# Patient Record
Sex: FEMALE | Race: BLACK OR AFRICAN AMERICAN | NOT HISPANIC OR LATINO | Employment: UNEMPLOYED | URBAN - METROPOLITAN AREA
[De-identification: names, ages, dates, MRNs, and addresses within clinical notes are randomized per-mention and may not be internally consistent; named-entity substitution may affect disease eponyms.]

---

## 2023-06-02 LAB
ALANINE AMINOTRANSFERASE (SGPT) (U/L) IN SER/PLAS: 14 U/L (ref 7–45)
ALBUMIN (G/DL) IN SER/PLAS: 4.1 G/DL (ref 3.4–5)
ALKALINE PHOSPHATASE (U/L) IN SER/PLAS: 82 U/L (ref 33–136)
ANION GAP IN SER/PLAS: 12 MMOL/L (ref 10–20)
ASPARTATE AMINOTRANSFERASE (SGOT) (U/L) IN SER/PLAS: 23 U/L (ref 9–39)
BASOPHILS (10*3/UL) IN BLOOD BY AUTOMATED COUNT: 0.04 X10E9/L (ref 0–0.1)
BASOPHILS/100 LEUKOCYTES IN BLOOD BY AUTOMATED COUNT: 0.8 % (ref 0–2)
BILIRUBIN TOTAL (MG/DL) IN SER/PLAS: 0.3 MG/DL (ref 0–1.2)
CALCIDIOL (25 OH VITAMIN D3) (NG/ML) IN SER/PLAS: 24 NG/ML
CALCIUM (MG/DL) IN SER/PLAS: 9.6 MG/DL (ref 8.6–10.6)
CARBON DIOXIDE, TOTAL (MMOL/L) IN SER/PLAS: 26 MMOL/L (ref 21–32)
CHLORIDE (MMOL/L) IN SER/PLAS: 108 MMOL/L (ref 98–107)
COBALAMIN (VITAMIN B12) (PG/ML) IN SER/PLAS: >2000 PG/ML (ref 211–911)
CREATININE (MG/DL) IN SER/PLAS: 0.98 MG/DL (ref 0.5–1.05)
EOSINOPHILS (10*3/UL) IN BLOOD BY AUTOMATED COUNT: 0.37 X10E9/L (ref 0–0.4)
EOSINOPHILS/100 LEUKOCYTES IN BLOOD BY AUTOMATED COUNT: 7 % (ref 0–6)
ERYTHROCYTE DISTRIBUTION WIDTH (RATIO) BY AUTOMATED COUNT: 14.5 % (ref 11.5–14.5)
ERYTHROCYTE MEAN CORPUSCULAR HEMOGLOBIN CONCENTRATION (G/DL) BY AUTOMATED: 31.4 G/DL (ref 32–36)
ERYTHROCYTE MEAN CORPUSCULAR VOLUME (FL) BY AUTOMATED COUNT: 92 FL (ref 80–100)
ERYTHROCYTES (10*6/UL) IN BLOOD BY AUTOMATED COUNT: 4.39 X10E12/L (ref 4–5.2)
FERRITIN (UG/LL) IN SER/PLAS: 48 UG/L (ref 8–150)
FOLATE (NG/ML) IN SER/PLAS: 21.8 NG/ML
GFR FEMALE: 61 ML/MIN/1.73M2
GLUCOSE (MG/DL) IN SER/PLAS: 73 MG/DL (ref 74–99)
HEMATOCRIT (%) IN BLOOD BY AUTOMATED COUNT: 40.4 % (ref 36–46)
HEMOGLOBIN (G/DL) IN BLOOD: 12.7 G/DL (ref 12–16)
IMMATURE GRANULOCYTES/100 LEUKOCYTES IN BLOOD BY AUTOMATED COUNT: 0.2 % (ref 0–0.9)
IRON (UG/DL) IN SER/PLAS: 71 UG/DL (ref 35–150)
LEUKOCYTES (10*3/UL) IN BLOOD BY AUTOMATED COUNT: 5.3 X10E9/L (ref 4.4–11.3)
LYMPHOCYTES (10*3/UL) IN BLOOD BY AUTOMATED COUNT: 2.03 X10E9/L (ref 0.8–3)
LYMPHOCYTES/100 LEUKOCYTES IN BLOOD BY AUTOMATED COUNT: 38.2 % (ref 13–44)
MONOCYTES (10*3/UL) IN BLOOD BY AUTOMATED COUNT: 0.46 X10E9/L (ref 0.05–0.8)
MONOCYTES/100 LEUKOCYTES IN BLOOD BY AUTOMATED COUNT: 8.6 % (ref 2–10)
NEUTROPHILS (10*3/UL) IN BLOOD BY AUTOMATED COUNT: 2.41 X10E9/L (ref 1.6–5.5)
NEUTROPHILS/100 LEUKOCYTES IN BLOOD BY AUTOMATED COUNT: 45.2 % (ref 40–80)
NRBC (PER 100 WBCS) BY AUTOMATED COUNT: 0 /100 WBC (ref 0–0)
PARATHYRIN INTACT (PG/ML) IN SER/PLAS: 59.9 PG/ML (ref 18.5–88)
PLATELETS (10*3/UL) IN BLOOD AUTOMATED COUNT: 337 X10E9/L (ref 150–450)
POTASSIUM (MMOL/L) IN SER/PLAS: 5.3 MMOL/L (ref 3.5–5.3)
PROTEIN TOTAL: 6.7 G/DL (ref 6.4–8.2)
SODIUM (MMOL/L) IN SER/PLAS: 141 MMOL/L (ref 136–145)
UREA NITROGEN (MG/DL) IN SER/PLAS: 21 MG/DL (ref 6–23)

## 2023-06-05 LAB
COPPER: NORMAL
VITAMIN B1, WHOLE BLOOD: 169 NMOL/L (ref 70–180)
ZINC,SERUM OR PLASMA: NORMAL

## 2023-09-27 PROBLEM — M62.89 PELVIC FLOOR DYSFUNCTION: Status: ACTIVE | Noted: 2023-09-27

## 2023-09-27 PROBLEM — L90.0 LICHEN SCLEROSUS: Status: ACTIVE | Noted: 2023-09-27

## 2023-09-27 PROBLEM — K21.9 GASTROESOPHAGEAL REFLUX DISEASE: Status: ACTIVE | Noted: 2023-09-27

## 2023-09-27 PROBLEM — N76.3 CHRONIC VULVITIS: Status: ACTIVE | Noted: 2023-09-27

## 2023-09-27 PROBLEM — L30.9 ECZEMA: Status: ACTIVE | Noted: 2023-09-27

## 2023-09-27 PROBLEM — M79.7 FIBROMYALGIA: Status: ACTIVE | Noted: 2023-09-27

## 2023-09-27 PROBLEM — I50.9 HEART FAILURE (MULTI): Status: ACTIVE | Noted: 2023-09-27

## 2023-09-27 PROBLEM — N39.41 URGE INCONTINENCE: Status: ACTIVE | Noted: 2023-09-27

## 2023-09-27 PROBLEM — N81.89 PELVIC FLOOR WEAKNESS: Status: ACTIVE | Noted: 2023-09-27

## 2023-09-27 PROBLEM — R31.9 HEMATURIA OF UNDIAGNOSED CAUSE: Status: ACTIVE | Noted: 2023-09-27

## 2023-09-27 PROBLEM — K90.9 INTESTINAL MALABSORPTION (HHS-HCC): Status: ACTIVE | Noted: 2023-09-27

## 2023-09-27 PROBLEM — K80.50 BILIARY COLIC: Status: ACTIVE | Noted: 2023-09-27

## 2023-09-27 PROBLEM — E78.5 HYPERLIPIDEMIA: Status: ACTIVE | Noted: 2023-09-27

## 2023-09-27 PROBLEM — R63.5 ABNORMAL WEIGHT GAIN: Status: ACTIVE | Noted: 2023-09-27

## 2023-09-27 PROBLEM — Z96.659 ARTIFICIAL KNEE JOINT PRESENT: Status: ACTIVE | Noted: 2023-09-27

## 2023-09-27 PROBLEM — E66.9 OBESITY: Status: ACTIVE | Noted: 2023-09-27

## 2023-09-27 PROBLEM — M17.9 OSTEOARTHRITIS OF KNEE: Status: ACTIVE | Noted: 2023-09-27

## 2023-09-27 PROBLEM — M65.9 TENOSYNOVITIS OF THUMB: Status: ACTIVE | Noted: 2023-09-27

## 2023-09-27 PROBLEM — K57.90 DIVERTICULAR DISEASE: Status: ACTIVE | Noted: 2023-09-27

## 2023-09-27 PROBLEM — R32 URINARY INCONTINENCE: Status: ACTIVE | Noted: 2023-09-27

## 2023-09-27 PROBLEM — M65.949 TENOSYNOVITIS OF THUMB: Status: ACTIVE | Noted: 2023-09-27

## 2023-09-27 PROBLEM — Z90.49 ACQUIRED ABSENCE OF OTHER SPECIFIED PARTS OF DIGESTIVE TRACT: Status: ACTIVE | Noted: 2023-09-27

## 2023-09-27 PROBLEM — Z91.89 AT RISK FOR BLEEDING: Status: ACTIVE | Noted: 2023-09-27

## 2023-09-27 PROBLEM — G47.00 INSOMNIA: Status: ACTIVE | Noted: 2023-09-27

## 2023-09-27 PROBLEM — R35.1 NOCTURIA: Status: ACTIVE | Noted: 2023-09-27

## 2023-09-27 PROBLEM — M19.031 ARTHRITIS OF WRIST, RIGHT: Status: ACTIVE | Noted: 2023-09-27

## 2023-09-27 PROBLEM — G47.30 SLEEP APNEA: Status: ACTIVE | Noted: 2023-09-27

## 2023-09-27 PROBLEM — E21.3 HYPERPARATHYROIDISM (MULTI): Status: ACTIVE | Noted: 2023-09-27

## 2023-09-27 PROBLEM — A04.8 H. PYLORI INFECTION: Status: ACTIVE | Noted: 2023-09-27

## 2023-09-27 PROBLEM — I10 ESSENTIAL HYPERTENSION: Status: ACTIVE | Noted: 2023-09-27

## 2023-09-27 PROBLEM — N95.2 ATROPHIC VAGINITIS: Status: ACTIVE | Noted: 2023-09-27

## 2023-09-27 PROBLEM — E55.9 VITAMIN D DEFICIENCY: Status: ACTIVE | Noted: 2023-09-27

## 2023-09-27 PROBLEM — J45.909 ASTHMA (HHS-HCC): Status: ACTIVE | Noted: 2023-09-27

## 2023-09-27 PROBLEM — E16.2 HYPOGLYCEMIA: Status: ACTIVE | Noted: 2023-09-27

## 2023-09-27 RX ORDER — ESTRADIOL 0.1 MG/G
CREAM VAGINAL
COMMUNITY
Start: 2022-08-12 | End: 2023-10-26 | Stop reason: ALTCHOICE

## 2023-09-27 RX ORDER — MAGNESIUM 250 MG
TABLET ORAL
COMMUNITY
End: 2023-10-26 | Stop reason: ALTCHOICE

## 2023-09-27 RX ORDER — TRAMADOL HYDROCHLORIDE 50 MG/1
TABLET ORAL
COMMUNITY
Start: 2016-05-30 | End: 2023-10-26 | Stop reason: SINTOL

## 2023-09-27 RX ORDER — HYDROCHLOROTHIAZIDE 25 MG/1
TABLET ORAL
COMMUNITY
Start: 2015-01-30

## 2023-09-27 RX ORDER — CYCLOBENZAPRINE HCL 10 MG
TABLET ORAL
COMMUNITY
Start: 2015-01-30 | End: 2023-10-26 | Stop reason: ALTCHOICE

## 2023-09-27 RX ORDER — ZOLPIDEM TARTRATE 10 MG/1
TABLET ORAL
COMMUNITY
Start: 2015-02-06 | End: 2023-10-26 | Stop reason: SINTOL

## 2023-09-27 RX ORDER — FAMOTIDINE 40 MG/1
TABLET, FILM COATED ORAL
COMMUNITY
Start: 2016-01-08 | End: 2023-10-26 | Stop reason: ALTCHOICE

## 2023-09-27 RX ORDER — CHOLESTYRAMINE 4 G/9G
POWDER, FOR SUSPENSION ORAL
COMMUNITY
Start: 2022-04-15

## 2023-09-27 RX ORDER — DIPHENHYDRAMINE HCL 25 MG
CAPSULE ORAL
COMMUNITY
Start: 2015-12-21 | End: 2023-10-26 | Stop reason: ALTCHOICE

## 2023-09-27 RX ORDER — MULTIVITAMIN
TABLET ORAL
COMMUNITY
Start: 2016-01-08

## 2023-09-27 RX ORDER — IBUPROFEN 600 MG/1
TABLET ORAL
COMMUNITY
Start: 2017-03-19 | End: 2023-10-26 | Stop reason: SINTOL

## 2023-09-27 RX ORDER — DIAZEPAM 5 MG/1
TABLET ORAL
COMMUNITY
Start: 2016-05-30 | End: 2023-10-26 | Stop reason: ALTCHOICE

## 2023-09-27 RX ORDER — AMITRIPTYLINE HYDROCHLORIDE 10 MG/1
1 TABLET, FILM COATED ORAL NIGHTLY
COMMUNITY
Start: 2022-06-30 | End: 2023-10-26 | Stop reason: ALTCHOICE

## 2023-09-27 RX ORDER — IBUPROFEN 800 MG/1
TABLET ORAL
COMMUNITY
Start: 2016-01-08 | End: 2023-10-26 | Stop reason: SINTOL

## 2023-09-27 RX ORDER — MOMETASONE FUROATE 220 UG/1
INHALANT RESPIRATORY (INHALATION)
COMMUNITY
Start: 2016-01-08 | End: 2023-10-26 | Stop reason: ALTCHOICE

## 2023-09-27 RX ORDER — FAMOTIDINE 20 MG/1
TABLET, FILM COATED ORAL
COMMUNITY
Start: 2016-01-11 | End: 2023-10-26 | Stop reason: ALTCHOICE

## 2023-09-27 RX ORDER — TIZANIDINE 4 MG/1
TABLET ORAL
COMMUNITY
Start: 2023-09-07 | End: 2024-02-05 | Stop reason: SDUPTHER

## 2023-09-27 RX ORDER — GABAPENTIN 300 MG/1
TABLET, FILM COATED ORAL
COMMUNITY
End: 2024-04-02 | Stop reason: ALTCHOICE

## 2023-09-27 RX ORDER — CLOTRIMAZOLE AND BETAMETHASONE DIPROPIONATE 10; .64 MG/G; MG/G
CREAM TOPICAL
COMMUNITY
Start: 2022-05-18

## 2023-09-27 RX ORDER — ALBUTEROL SULFATE 90 UG/1
AEROSOL, METERED RESPIRATORY (INHALATION)
COMMUNITY
Start: 2014-04-08

## 2023-09-27 RX ORDER — OMEPRAZOLE 40 MG/1
CAPSULE, DELAYED RELEASE ORAL
COMMUNITY
Start: 2016-01-28

## 2023-10-04 ENCOUNTER — CLINICAL SUPPORT (OUTPATIENT)
Dept: AUDIOLOGY | Facility: CLINIC | Age: 73
End: 2023-10-04
Payer: MEDICARE

## 2023-10-04 DIAGNOSIS — R42 VERTIGO: ICD-10-CM

## 2023-10-04 DIAGNOSIS — H90.3 SENSORINEURAL HEARING LOSS (SNHL) OF BOTH EARS: ICD-10-CM

## 2023-10-04 DIAGNOSIS — H93.13 TINNITUS OF BOTH EARS: Primary | ICD-10-CM

## 2023-10-04 PROCEDURE — 92550 TYMPANOMETRY & REFLEX THRESH: CPT | Performed by: AUDIOLOGIST

## 2023-10-04 PROCEDURE — 92557 COMPREHENSIVE HEARING TEST: CPT | Performed by: AUDIOLOGIST

## 2023-10-04 NOTE — PROGRESS NOTES
"  AUDIOMETRIC EVALUATION    Name:  Oxana Rao  :  1950  Age:  73 y.o.  Date of Evaluation:  2023    HISTORY:  Reason for visit: Ms. Rao is seen today for an evaluation of hearing in conjunction with an upcoming appointment with Jamia Sorto CNP. Patient was unaccompanied.     Patient reports recent exacerbation in bilateral tinnitus. She is also reporting a throbbing headache on the right side of her head with pain described as feeling like an ice-pick in going into her head. She endorses some nausea. She reports having a vertigo attack yesterday when standing up from laying down. She has been taking dramamine. She endorses TMJ issues. Ms. Rao also reports beginning a new medication Metformin approximately two weeks ago.    Denies: Otalgia, aural fullness, hearing loss    EVALUATION:    See Audiogram and Immittance results under \"Media\".    RESULTS:     Otoscopic Evaluation:     RIGHT  External ear exam: Normal   Internal ear exam: Significant amount of cerumen occluding canal and prohibiting visualization of TM    LEFT  External ear exam: Normal   Internal ear exam: Significant amount of cerumen occluding canal and prohibiting visualization of TM    Immittance:   Immittance Measures: 226 Hz          Right Ear: Type A: Middle ear pressure and eardrum mobility within defined limits         Left Ear:  Type A: Middle ear pressure and eardrum mobility within defined limits    Reflexes and Reflex Decay:    Ipsilateral Reflexes (500-4000 Hz):          Probe/Stimulus Right Ear: Present 500-4000 Hz       Probe/Stimulus Left Ear: Present 500-4000 Hz    Contralateral Reflexes (500-4000 Hz):  Did not test         Acoustic Reflex Decay (contralateral at 500 and 1000 Hz): Did not test           Otoacoustic Emissions [DP(OAEs)]: Did not test         Audiometry:  Test Technique: Standard Audiometry under TDH headphones.    Reliability: good     Pure Tone Audiometry:    Right: Hearing sensitivity within " normal limits at 125-4000 Hz falling to mild likely sensorineural hearing loss at 3744-7917 Hz   Left: Hearing sensitivity within normal limits at 125-4000 Hz falling to mild likely sensorineural hearing loss at 8617-8108 Hz       Speech Audiometry (via recorded, 25-words unless noted; M=masked):       Right Ear: Speech Reception Threshold (SRT) was obtained at 15 dBHL  Word Recognition Scores were excellent (100%) in quiet when words were presented at 55 dBHL, using the NU-6 2A word list.  Left Ear: Speech Reception Threshold (SRT) was obtained at 10 dBHL  Word Recognition Scores were excellent (96%) in quiet when words were presented at 55 dBHL, using the NU-6 3A word list.      IMPRESSIONS:  Today's test results suggest normal middle ear function and normal hearing levels at 125-4000 Hz falling to mild likely sensorineural  hearing loss at 7483-7512 Hz . Word understanding is excellent, bilaterally.    PATIENT EDUCATION:   Ms. Rao was counseled with regard to the findings.       PLAN:  See Jamia Sorto CNP as scheduled on 10/31/23.  Follow up with medical providers as directed.   Retest hearing in conjunction with medical management and sooner if any concerns arise.        Anna Ford, MARIE, CCC-A  Clinical Audiologist    Degree of   Hearing Sensitivity dB Range   Within Normal Limits (WNL) 0 - 20   Slight 25   Mild 26 - 40   Moderate 41 - 55   Moderately-Severe 56 - 70   Severe 71 - 90   Profound 91 +     KEY  TM Tympanic Membrane   WNL Within Normal Limits   HA Hearing Aid   SNHL Sensorineural Hearing Loss   CHL Conductive Hearing Loss   NIHL Noise-Induced Hearing Loss   ECV Ear Canal Volume

## 2023-10-20 ENCOUNTER — APPOINTMENT (OUTPATIENT)
Dept: SURGERY | Facility: CLINIC | Age: 73
End: 2023-10-20
Payer: MEDICARE

## 2023-10-24 ENCOUNTER — APPOINTMENT (OUTPATIENT)
Dept: SURGERY | Facility: CLINIC | Age: 73
End: 2023-10-24
Payer: MEDICARE

## 2023-10-24 ENCOUNTER — OFFICE VISIT (OUTPATIENT)
Dept: SURGERY | Facility: CLINIC | Age: 73
End: 2023-10-24
Payer: MEDICARE

## 2023-10-24 VITALS
HEART RATE: 74 BPM | BODY MASS INDEX: 29.82 KG/M2 | WEIGHT: 190 LBS | DIASTOLIC BLOOD PRESSURE: 85 MMHG | SYSTOLIC BLOOD PRESSURE: 146 MMHG | HEIGHT: 67 IN

## 2023-10-24 DIAGNOSIS — E66.9 OBESITY, UNSPECIFIED CLASSIFICATION, UNSPECIFIED OBESITY TYPE, UNSPECIFIED WHETHER SERIOUS COMORBIDITY PRESENT: ICD-10-CM

## 2023-10-24 DIAGNOSIS — E53.8 VITAMIN B12 DEFICIENCY: ICD-10-CM

## 2023-10-24 DIAGNOSIS — K90.9 INTESTINAL MALABSORPTION, UNSPECIFIED TYPE (HHS-HCC): Primary | ICD-10-CM

## 2023-10-24 PROCEDURE — 1036F TOBACCO NON-USER: CPT

## 2023-10-24 PROCEDURE — 3079F DIAST BP 80-89 MM HG: CPT

## 2023-10-24 PROCEDURE — 99213 OFFICE O/P EST LOW 20 MIN: CPT

## 2023-10-24 PROCEDURE — 1125F AMNT PAIN NOTED PAIN PRSNT: CPT

## 2023-10-24 PROCEDURE — 1159F MED LIST DOCD IN RCRD: CPT

## 2023-10-24 PROCEDURE — 1160F RVW MEDS BY RX/DR IN RCRD: CPT

## 2023-10-24 PROCEDURE — 3077F SYST BP >= 140 MM HG: CPT

## 2023-10-24 RX ORDER — CYANOCOBALAMIN 1000 UG/ML
1000 INJECTION, SOLUTION INTRAMUSCULAR; SUBCUTANEOUS ONCE
Status: COMPLETED | OUTPATIENT
Start: 2023-10-24 | End: 2023-10-24

## 2023-10-24 ASSESSMENT — PATIENT HEALTH QUESTIONNAIRE - PHQ9
2. FEELING DOWN, DEPRESSED OR HOPELESS: NOT AT ALL
1. LITTLE INTEREST OR PLEASURE IN DOING THINGS: NOT AT ALL
SUM OF ALL RESPONSES TO PHQ9 QUESTIONS 1 AND 2: 0

## 2023-10-24 ASSESSMENT — COLUMBIA-SUICIDE SEVERITY RATING SCALE - C-SSRS
2. HAVE YOU ACTUALLY HAD ANY THOUGHTS OF KILLING YOURSELF?: NO
6. HAVE YOU EVER DONE ANYTHING, STARTED TO DO ANYTHING, OR PREPARED TO DO ANYTHING TO END YOUR LIFE?: NO
1. IN THE PAST MONTH, HAVE YOU WISHED YOU WERE DEAD OR WISHED YOU COULD GO TO SLEEP AND NOT WAKE UP?: NO

## 2023-10-24 ASSESSMENT — ENCOUNTER SYMPTOMS
DEPRESSION: 0
OCCASIONAL FEELINGS OF UNSTEADINESS: 0
LOSS OF SENSATION IN FEET: 0

## 2023-10-24 ASSESSMENT — LIFESTYLE VARIABLES
HOW OFTEN DO YOU HAVE SIX OR MORE DRINKS ON ONE OCCASION: LESS THAN MONTHLY
HOW OFTEN DO YOU HAVE A DRINK CONTAINING ALCOHOL: MONTHLY OR LESS
HOW MANY STANDARD DRINKS CONTAINING ALCOHOL DO YOU HAVE ON A TYPICAL DAY: 1 OR 2
AUDIT-C TOTAL SCORE: 2
SKIP TO QUESTIONS 9-10: 0

## 2023-10-24 ASSESSMENT — PAIN SCALES - GENERAL: PAINLEVEL: 8

## 2023-10-24 NOTE — PROGRESS NOTES
"Subjective   Patient ID: Oxana Rao is a 73 y.o. female who presents for [facility].    HPI   Oxana is here for follow up after starting metformin. She has lost 1 pound since her last visit. She tells me that she was taking 500mg daily because she does not always eat.    Visit Vitals  /85 (BP Location: Left arm, Patient Position: Sitting)   Pulse 74   Ht 1.695 m (5' 6.75\")   Wt 86.2 kg (190 lb)   BMI 29.98 kg/m²   Smoking Status Never   BSA 2.01 m²   -1 lb    Review of Systems  CONSTITUTIONAL:          Chills No.  Fatigue yes.  Fever No.         CARDIOLOGY:          Negative for dizziness, chest pain, palpitations, shortness of breath.         RESPIRATORY:          Sleep Apnea No.  Negative for chest congestion, cough, wheezing.         GASTROENTEROLOGY:          Food Intolerance No.  Abdominal pain No.  Acid reflux No.  Black stools No.  Constipation No.  Diarrhea No.  Loss of appetite no.  Nausea No.  Vomiting No.         UROLOGY:          Negative for dysuria, urinary urgency, kidney stones.         PSYCHOLOGY:          Negative for depression, anxiety, high stress level.    Objective   Physical Exam  Constitutional:       Appearance: Normal appearance.   Eyes:      Pupils: Pupils are equal, round, and reactive to light.   Cardiovascular:      Rate and Rhythm: Normal rate.   Pulmonary:      Effort: Pulmonary effort is normal.   Abdominal:      Palpations: Abdomen is soft.   Musculoskeletal:         General: Normal range of motion.   Skin:     General: Skin is warm and dry.   Neurological:      General: No focal deficit present.      Mental Status: She is alert and oriented to person, place, and time.   Psychiatric:         Mood and Affect: Mood normal.         Assessment/Plan   Problem List Items Addressed This Visit             ICD-10-CM    Intestinal malabsorption - Primary K90.9    Obesity E66.9    Vitamin B12 deficiency E53.8    Relevant Medications    cyanocobalamin (Vitamin B-12) injection 1,000 mcg " (Completed)     Oxana will try to drink a protein shake throughout the day. She will take a dose of metformin with that along with her dinner time meal.

## 2023-10-25 ENCOUNTER — TELEPHONE (OUTPATIENT)
Dept: PRIMARY CARE | Facility: CLINIC | Age: 73
End: 2023-10-25
Payer: MEDICARE

## 2023-10-25 NOTE — TELEPHONE ENCOUNTER
Patient called in stating she has had a headache since Saturday and it is constant. Pt requested a same day appointment but both [facility] and [facility] schedules are full. Pt is asking for PCP recommendation as she does not want to go to the ER. Pt states the pain is causing her to feel nauseous.

## 2023-10-25 NOTE — TELEPHONE ENCOUNTER
Patient called in regards to previous TE, states her phone rang twice then hung up when our office called her back. I attempted to advise patient of PCP recommendation and patient stopped me. Pt states that she has made an appointment with a new provider at [facility] due to waiting all day to be called back in regards to her symptoms. Sent to PCP as an FYI.

## 2023-10-25 NOTE — TELEPHONE ENCOUNTER
She can try the urgent care. She will need her BP checked and they may be able to give her a shot to reduce her HA.

## 2023-10-26 ENCOUNTER — OFFICE VISIT (OUTPATIENT)
Dept: PRIMARY CARE | Facility: CLINIC | Age: 73
End: 2023-10-26
Payer: MEDICARE

## 2023-10-26 VITALS
TEMPERATURE: 95.3 F | HEIGHT: 67 IN | WEIGHT: 190.4 LBS | SYSTOLIC BLOOD PRESSURE: 134 MMHG | OXYGEN SATURATION: 97 % | RESPIRATION RATE: 18 BRPM | BODY MASS INDEX: 29.88 KG/M2 | HEART RATE: 106 BPM | DIASTOLIC BLOOD PRESSURE: 78 MMHG

## 2023-10-26 DIAGNOSIS — G43.011 INTRACTABLE MIGRAINE WITHOUT AURA AND WITH STATUS MIGRAINOSUS: Primary | ICD-10-CM

## 2023-10-26 DIAGNOSIS — R11.0 NAUSEA: ICD-10-CM

## 2023-10-26 PROCEDURE — 1160F RVW MEDS BY RX/DR IN RCRD: CPT | Performed by: NURSE PRACTITIONER

## 2023-10-26 PROCEDURE — 99214 OFFICE O/P EST MOD 30 MIN: CPT | Performed by: NURSE PRACTITIONER

## 2023-10-26 PROCEDURE — 1125F AMNT PAIN NOTED PAIN PRSNT: CPT | Performed by: NURSE PRACTITIONER

## 2023-10-26 PROCEDURE — 1159F MED LIST DOCD IN RCRD: CPT | Performed by: NURSE PRACTITIONER

## 2023-10-26 PROCEDURE — 3078F DIAST BP <80 MM HG: CPT | Performed by: NURSE PRACTITIONER

## 2023-10-26 PROCEDURE — 1036F TOBACCO NON-USER: CPT | Performed by: NURSE PRACTITIONER

## 2023-10-26 PROCEDURE — 3075F SYST BP GE 130 - 139MM HG: CPT | Performed by: NURSE PRACTITIONER

## 2023-10-26 PROCEDURE — 2500000004 HC RX 250 GENERAL PHARMACY W/ HCPCS (ALT 636 FOR OP/ED): Performed by: NURSE PRACTITIONER

## 2023-10-26 RX ORDER — ONDANSETRON 4 MG/1
8 TABLET, ORALLY DISINTEGRATING ORAL EVERY 8 HOURS PRN
Qty: 20 TABLET | Refills: 0 | Status: SHIPPED | OUTPATIENT
Start: 2023-10-26 | End: 2023-11-02

## 2023-10-26 RX ORDER — KETOROLAC TROMETHAMINE 30 MG/ML
30 INJECTION, SOLUTION INTRAMUSCULAR; INTRAVENOUS ONCE
Qty: 1 ML | Refills: 0 | Status: SHIPPED | OUTPATIENT
Start: 2023-10-26 | End: 2023-10-26 | Stop reason: ALTCHOICE

## 2023-10-26 RX ORDER — KETOROLAC TROMETHAMINE 30 MG/ML
30 INJECTION, SOLUTION INTRAMUSCULAR; INTRAVENOUS ONCE
Status: COMPLETED | OUTPATIENT
Start: 2023-10-26 | End: 2023-10-26

## 2023-10-26 RX ORDER — CYANOCOBALAMIN 1000 UG/ML
1000 INJECTION, SOLUTION INTRAMUSCULAR; SUBCUTANEOUS
COMMUNITY

## 2023-10-26 RX ADMIN — KETOROLAC TROMETHAMINE 30 MG: 30 INJECTION, SOLUTION INTRAMUSCULAR; INTRAVENOUS at 11:37

## 2023-10-26 ASSESSMENT — ENCOUNTER SYMPTOMS
VOMITING: 0
HEADACHES: 1
MIGRAINE HEADACHES: 1
PHOTOPHOBIA: 1
CONSTITUTIONAL NEGATIVE: 1
NAUSEA: 1
CARDIOVASCULAR NEGATIVE: 1
RESPIRATORY NEGATIVE: 1

## 2023-10-26 ASSESSMENT — PAIN SCALES - GENERAL
PAINLEVEL: 10-WORST PAIN EVER
PAINLEVEL_OUTOF10: 10 - WORST POSSIBLE PAIN

## 2023-10-26 NOTE — PROGRESS NOTES
"Subjective   Patient ID: Oxana Rao is a 73 y.o. female who presents for Headache (Headaches and dizziness, nausea, sharp pains under right breast, fatigue started on Saturday. Has been taking tylenol but no relief, states she is bruising without bumping into things).    Headache   This is a recurrent problem. The current episode started in the past 7 days. The problem has been unchanged. The pain is located in the Frontal region. The pain does not radiate. The pain quality is similar to prior headaches. The quality of the pain is described as pulsating. The pain is at a severity of 5/10. The pain is moderate. Associated symptoms include nausea, phonophobia and photophobia. Pertinent negatives include no vomiting. The symptoms are aggravated by bright light, activity, coughing, noise and sneezing. She has tried acetaminophen for the symptoms. The treatment provided mild relief. Her past medical history is significant for migraine headaches.       Review of Systems   Constitutional: Negative.    HENT: Negative.     Eyes:  Positive for photophobia.   Respiratory: Negative.     Cardiovascular: Negative.    Gastrointestinal:  Positive for nausea. Negative for vomiting.   Neurological:  Positive for headaches.       Objective   /78 (BP Location: Right arm, Patient Position: Sitting, BP Cuff Size: Adult)   Pulse 106   Temp 35.2 °C (95.3 °F) (Temporal)   Resp 18   Ht 1.695 m (5' 6.75\")   Wt 86.4 kg (190 lb 6.4 oz)   SpO2 97%   BMI 30.04 kg/m²       Physical Exam  Vitals reviewed.   Cardiovascular:      Rate and Rhythm: Normal rate and regular rhythm.      Pulses: Normal pulses.      Heart sounds: Normal heart sounds.   Pulmonary:      Effort: Pulmonary effort is normal.      Breath sounds: Normal breath sounds.   Neurological:      General: No focal deficit present.      Cranial Nerves: No cranial nerve deficit.      Sensory: No sensory deficit.      Motor: No weakness.      Coordination: Coordination " normal.      Gait: Gait normal.      Comments: Normal finger nose        Assessment/Plan     Diagnoses and all orders for this visit:  Intractable migraine without aura and with status migrainosus  -     ketorolac (Toradol) injection 30 mg  Nausea  -     ondansetron ODT (Zofran-ODT) 4 mg disintegrating tablet; Take 2 tablets (8 mg) by mouth every 8 hours if needed for nausea or vomiting for up to 7 days.

## 2023-10-31 ENCOUNTER — APPOINTMENT (OUTPATIENT)
Dept: OTOLARYNGOLOGY | Facility: CLINIC | Age: 73
End: 2023-10-31
Payer: MEDICARE

## 2023-11-10 ENCOUNTER — PROCEDURE VISIT (OUTPATIENT)
Dept: UROLOGY | Facility: CLINIC | Age: 73
End: 2023-11-10
Payer: MEDICARE

## 2023-11-10 VITALS
BODY MASS INDEX: 30.68 KG/M2 | SYSTOLIC BLOOD PRESSURE: 120 MMHG | HEART RATE: 94 BPM | WEIGHT: 194.4 LBS | DIASTOLIC BLOOD PRESSURE: 78 MMHG

## 2023-11-10 DIAGNOSIS — R10.2 PELVIC PAIN: ICD-10-CM

## 2023-11-10 DIAGNOSIS — N39.41 URGE INCONTINENCE: Primary | ICD-10-CM

## 2023-11-10 DIAGNOSIS — N95.2 ATROPHIC VAGINITIS: ICD-10-CM

## 2023-11-10 LAB
POC APPEARANCE, URINE: CLEAR
POC BILIRUBIN, URINE: NEGATIVE
POC BLOOD, URINE: ABNORMAL
POC COLOR, URINE: YELLOW
POC GLUCOSE, URINE: NEGATIVE MG/DL
POC KETONES, URINE: NEGATIVE MG/DL
POC LEUKOCYTES, URINE: ABNORMAL
POC NITRITE,URINE: NEGATIVE
POC PH, URINE: 5 PH
POC PROTEIN, URINE: NEGATIVE MG/DL
POC SPECIFIC GRAVITY, URINE: 1.02
POC UROBILINOGEN, URINE: 0.2 EU/DL

## 2023-11-10 PROCEDURE — 99213 OFFICE O/P EST LOW 20 MIN: CPT | Performed by: OBSTETRICS & GYNECOLOGY

## 2023-11-10 PROCEDURE — 52287 CYSTOSCOPY CHEMODENERVATION: CPT | Performed by: OBSTETRICS & GYNECOLOGY

## 2023-11-10 PROCEDURE — 81003 URINALYSIS AUTO W/O SCOPE: CPT | Performed by: OBSTETRICS & GYNECOLOGY

## 2023-11-10 PROCEDURE — 2500000004 HC RX 250 GENERAL PHARMACY W/ HCPCS (ALT 636 FOR OP/ED): Performed by: OBSTETRICS & GYNECOLOGY

## 2023-11-10 PROCEDURE — 99213 OFFICE O/P EST LOW 20 MIN: CPT | Mod: 25 | Performed by: OBSTETRICS & GYNECOLOGY

## 2023-11-10 RX ORDER — NITROFURANTOIN 25; 75 MG/1; MG/1
100 CAPSULE ORAL 2 TIMES DAILY
Qty: 6 CAPSULE | Refills: 0 | Status: SHIPPED | OUTPATIENT
Start: 2023-11-10 | End: 2023-11-13

## 2023-11-10 RX ORDER — NITROFURANTOIN 25; 75 MG/1; MG/1
100 CAPSULE ORAL ONCE
Status: COMPLETED | OUTPATIENT
Start: 2023-11-10 | End: 2023-11-10

## 2023-11-10 RX ADMIN — NITROFURANTOIN (MONOHYDRATE/MACROCRYSTALS) 100 MG: 75; 25 CAPSULE ORAL at 08:44

## 2023-11-10 RX ADMIN — ONABOTULINUMTOXINA 100 UNITS: 100 INJECTION, POWDER, LYOPHILIZED, FOR SOLUTION INTRAMUSCULAR at 08:43

## 2023-11-10 NOTE — PATIENT INSTRUCTIONS
Please follow-up in 2 weeks.    Please  your antibiotic therapy and use this for the next 3 days.    Please contact the clinic with any questions or concerns at [phone].

## 2023-11-10 NOTE — PROGRESS NOTES
Subjective   Patient ID: Oxana Rao is a 73 y.o. female who presents for 100 units of intradetrusor Botox.  HPI  73-year-old with history of exquisite pelvic floor pain, multiple mid urethral sling and vaginal surgeries, urge urinary incontinence, and vaginal atrophy having undergone 100 units of Botox today 11/10/2023     She has no other complaints.     From Previous note  This visit was performed through telemedicine  73-year-old with history of exquisite pelvic floor pain, multiple mid urethral sling and vaginal surgeries, urge urinary incontinence, and vaginal atrophy presenting to discuss urodynamics.     She has no new complaints.     From previous note  73-year-old with history of exquisite pelvic floor pain, multiple mid urethral sling and vaginal surgeries, urge urinary incontinence, and vaginal atrophy.     The patient notes worsening lower urinary tract complaints. .She notes 3-4 episodes of nocturia and has to rush to the bathroom. She voids 3-5 times during the day with episodes of incontinence in between. She does also note urinary leakage on movement. She has to wear depends to avoid accidents. She has also followed up with PFPT with no benefits. She denies any UTI likes symptoms today.     She denies any vaginal complaints, no abnormal vaginal bleeding or discharge.      She denies any bowel related complaints, no fecal or flatal incontinence.      She has no other complaints.            From previous note  72-year-old with history of exquisite pelvic floor pain, multiple mid urethral sling and vaginal surgeries, urge urinary incontinence, and vaginal atrophy.     The patient was last seen in August 2022. The patient complaints or worsening urinary urgency, she states she need to void as soon as she sees a bathroom. She utilizes pads to avoid accidents as she leaks. She denies any UTI like symptoms. She is not continuing her pelvic floor physical therapy. She is not utilizing the vaginal  estrogen cream.     She denies any vaginal complaints, no abnormal vaginal bleeding or discharge.     She denies any bowel related complaints, no fecal or flatal incontinence.     She has no other complaints.     From previous note  72-year-old with history of exquisite pelvic floor pain, multiple mid urethral sling and vaginal surgeries, urge urinary incontinence, and vaginal atrophy presenting for follow-up.     The patient is following up with pelvic floor physical therapy and is overall very satisfied with this. She did need to miss recent appointment due to bronchitis complaints. However she wishes to continue this moving forward.     She was previously recommended to start amitriptyline which she has not done. She was also counseled regarding her headache complaints to start this medication as well. She desires a new written prescription for this.     [facility] did not provide her urodynamic reports after multiple attempts. She does state that she will provide this at her earliest convenience.     She has no new complaints.     From previous note  72-year-old presenting as a referral from Dr. Park with complicated surgical pelvic floor history and urinary incontinence complaints.     The patient underwent a hysterectomy many years ago. In 1997 she believes she underwent an anterior colporrhaphy. In 2005 and 6 she believes she had a sling placed and again in 2014 another sling was placed. In 2019 she believes she may have had a third sling placed. In 2021 Dr. Maliha Benitez excised or released the sling. She denies any vaginal bulge symptoms and does not believe that any her prior surgeries was performed for this except that in 1997. She states that her surgeries performed in 2005, 2014, and 2019 were to help with her urinary incontinence complaints. Prior to her 2021 surgery, she had noted pain with urination and she believes that the sling was obstructive. Since that time she denies any pain.     She does note  episodic leaking with activity and during intercourse. She otherwise denies leaking with coughing or sneezing. She does feel significant urgency roughly every 2 hours. She notes 2-3 episodes of nocturia and denies enuresis. She has previously utilized oxybutynin and most recently Myrbetriq without improvement in her symptoms. She denies a history of nephrolithiasis, chronic urinary tract infections or any gross hematuria. She has previously been recommended to undergo pelvic floor physical therapy but has not followed up with this.     She otherwise has not been able to be sexually active in the last 2 to 3 years due to significant pain vaginally. She otherwise denies any abnormal vaginal bleeding or discharge. She does note vaginal dryness.     Patient denies any constipation complaints. She denies any fecal incontinence or flatal incontinence.     She has no other complaints.      Review of Systems  Constitutional: No fever, No chills and No fatigue.   Eyes: No vision problems and No dryness of the eyes.   ENT: No dry mouth, No hearing loss and No nosebleeds.   Cardiovascular: No chest pain, No palpitations and No orthopnea.   Respiratory: No shortness of breath, No cough and No wheezing.   Gastrointestinal: No abdominal pain, No constipation, No nausea, No diarrhea, No vomiting and No melena.   Genitourinary: As noted in HPI.   Musculoskeletal: No back pain, No myalgias, No muscle weakness, No joint swelling and No leg edema.   Integumentary: No rashes, No skin lesion and No itching.   Neurological: No headache, No numbness and No dizziness.   Psychiatric: No sleep disturbances, No anxiety and No depression.   Endocrine: No hot flashes, No loss of hair and No hirsutism.   Hematologic/Lymphatic: No swollen glands, No tendency for easy bleeding and No tendency for easy bruising.   All other systems have been reviewed and are negative for complaint.        Objective   Physical Exam    After the patient was  identified consent was then placed in the chart.  The patient was prepped with iodine and lidocaine.  A flexible cystoscope was then introduced into the bladder with normal-appearing bladder mucosa and ureteral orifices in the normal orthotopic position.  Urethra appeared completely normal.  The bladder wall was then injected in 5 locations across the posterior bladder wall with 2 cc of a mixture of 100 units Botox mixed into 10 cc of preservative-free normal saline.  There was no bleeding and the bladder was completely drained.  The patient tolerated the procedure well.  She received 100 mg of Macrobid at the completion of the case.    Assessment/Plan      73-year-old with history of exquisite pelvic floor pain, multiple mid urethral sling and vaginal surgeries, urge urinary incontinence, and vaginal atrophy having undergone 100 units of Botox today 11/10/2023      #1 we have previously discussed the patient's complicated history. We discussed today 9/28/2023 her urodynamics. It is notable for hypersensitivity with normal compliance and emptying without having evidence of obstruction from her prior mid urethral slings. We have been unable to obtain her records from [facility] unfortunately. She has not had any benefits to her lower urinary tract symptoms with anticholinergic and beta 3 agonist therapies in the past. However she has been noted to have exquisitely tight and tender pelvic floor muscles. She was previously having excellent results with her pelvic floor physical therapy. Unfortunately she has not noted any significant benefits with her recent pelvic floor physical therapy. We again discussed the possibility of Valium therapy in the future and we will readdress this at follow-up appointments. She is not interested in utilizing Gemtesa as this is cost prohibitive to her.  She underwent uncomplicated 100 units Botox today 11/10/2023.  She will proceed with a 3-day course of Macrobid therapy.    #2 we again  discussed our lower urinary tract symptoms may be related to her exquisite pelvic floor pain. The patient has previously utilized amitriptyline with minimal benefit. We will readdress this at follow-up appointments.     3. We discussed the patient's vaginal atrophy. We discussed the safety, efficacy, proper utilization of vaginal estrogen therapy and she we will continue this 3 times a week moving forward.     #4 the patient will follow-up in 2 weeks for PVR and urinalysis and discussion of her lower urinary tract symptoms.     JALEN Costa MD     Scribe Attestation  By signing my name below, I, Jennifer Arceo attest that this documentation has been prepared under the direction and in the presence of Henry Costa MD. All medical record entries made by the Scribe were at my direction or personally dictated by me. I have reviewed the chart and agree that the record accurately reflects my personal performance of the history, physical exam, discussion and plan.

## 2023-11-14 ENCOUNTER — OFFICE VISIT (OUTPATIENT)
Dept: OTOLARYNGOLOGY | Facility: CLINIC | Age: 73
End: 2023-11-14
Payer: MEDICARE

## 2023-11-14 VITALS — HEIGHT: 67 IN | BODY MASS INDEX: 30.68 KG/M2 | TEMPERATURE: 95.9 F

## 2023-11-14 DIAGNOSIS — M26.609 TMJ DYSFUNCTION: ICD-10-CM

## 2023-11-14 DIAGNOSIS — M43.6 NECK STIFFNESS: ICD-10-CM

## 2023-11-14 DIAGNOSIS — H90.3 SENSORINEURAL HEARING LOSS (SNHL) OF BOTH EARS: ICD-10-CM

## 2023-11-14 DIAGNOSIS — H61.23 BILATERAL IMPACTED CERUMEN: ICD-10-CM

## 2023-11-14 DIAGNOSIS — R42 VERTIGO: Primary | ICD-10-CM

## 2023-11-14 DIAGNOSIS — M54.2 NECK PAIN: ICD-10-CM

## 2023-11-14 DIAGNOSIS — R51.9 CHRONIC NONINTRACTABLE HEADACHE, UNSPECIFIED HEADACHE TYPE: ICD-10-CM

## 2023-11-14 DIAGNOSIS — G89.29 CHRONIC NONINTRACTABLE HEADACHE, UNSPECIFIED HEADACHE TYPE: ICD-10-CM

## 2023-11-14 DIAGNOSIS — H93.13 TINNITUS OF BOTH EARS: ICD-10-CM

## 2023-11-14 PROCEDURE — 1159F MED LIST DOCD IN RCRD: CPT | Performed by: NURSE PRACTITIONER

## 2023-11-14 PROCEDURE — 3075F SYST BP GE 130 - 139MM HG: CPT | Performed by: NURSE PRACTITIONER

## 2023-11-14 PROCEDURE — 1125F AMNT PAIN NOTED PAIN PRSNT: CPT | Performed by: NURSE PRACTITIONER

## 2023-11-14 PROCEDURE — 1036F TOBACCO NON-USER: CPT | Performed by: NURSE PRACTITIONER

## 2023-11-14 PROCEDURE — 1160F RVW MEDS BY RX/DR IN RCRD: CPT | Performed by: NURSE PRACTITIONER

## 2023-11-14 PROCEDURE — 3078F DIAST BP <80 MM HG: CPT | Performed by: NURSE PRACTITIONER

## 2023-11-14 PROCEDURE — 99204 OFFICE O/P NEW MOD 45 MIN: CPT | Performed by: NURSE PRACTITIONER

## 2023-11-14 RX ORDER — ERGOCALCIFEROL 1.25 MG/1
CAPSULE ORAL
COMMUNITY
Start: 2023-11-09 | End: 2024-05-13 | Stop reason: ALTCHOICE

## 2023-11-14 RX ORDER — VERAPAMIL HYDROCHLORIDE 120 MG/1
TABLET, FILM COATED, EXTENDED RELEASE ORAL
COMMUNITY
Start: 2023-11-09 | End: 2024-04-02

## 2023-11-14 RX ORDER — METFORMIN HYDROCHLORIDE 500 MG/1
TABLET ORAL
COMMUNITY
Start: 2023-11-10

## 2023-11-14 RX ORDER — GABAPENTIN 300 MG/1
CAPSULE ORAL
COMMUNITY
Start: 2023-11-06 | End: 2024-03-21

## 2023-11-14 ASSESSMENT — PATIENT HEALTH QUESTIONNAIRE - PHQ9
SUM OF ALL RESPONSES TO PHQ9 QUESTIONS 1 & 2: 0
2. FEELING DOWN, DEPRESSED OR HOPELESS: NOT AT ALL
1. LITTLE INTEREST OR PLEASURE IN DOING THINGS: NOT AT ALL

## 2023-11-14 NOTE — PROGRESS NOTES
Subjective   Patient ID: Oxana Rao is a 73 y.o. female who presents for Follow-up (Vertigo and constant ringing in both ear (loudly) ear cleaning.).  HPI  This patient is referred for evaluation of  episodic vertiginous  dizziness. The patient is not accompanied by anyone.  The approximate duration of her complaints is years.  The patient describes her dizziness as intermittent episodes of spinning lasting 1 to 6 days accompanied by headache, nausea, vomiting.  Her most recent episode was 10/25/2023.  She was evaluated in an urgent care and diagnosed with migraine.  She had a dose of Toradol which improved symptoms for 1 day.  Symptoms eventually resolved on their own.  When asked about ear pain, headache, phono-photophobia, visual or motion intolerance, sound or pressure induced symptoms, hearing loss, discharge from ear, tinnitus, aural fullness or autophony, the patient admits to headaches (+fam hx of migraine ), 1 incident of motion intolerance on a boat, constant bilateral tinnitus which is quite bothersome and sometimes interferes with sleep, occasional autophony.  She reports that she does notice an increase in the severity of her tinnitus when her vertigo is active.    When asked about a significant past otological history including history of prior ear surgery, noise exposure, exposure to ototoxic drugs or agents, and/or family history of hearing loss, the patient admits to recreational noise exposure.  She endorses history of bruxism and neck pain/stiffness.  She used to wear a bite guard at night but lost it when she moved.    Review of Systems  A comprehensive or 10 points review of the patient´s constitutional, neurological, HEENT, pulmonary, cardiovascular and genito-urinary systems showed only those mentioned in history of present illness.    Objective   Physical Exam  Constitutional: no fever, chills, weight loss or weight gain   General appearance: Appears well, well-nourished, well groomed. No  acute distress.   Communication: Normal communication   Psychiatric: Oriented to person, place and time. Normal mood and affect.   Neurologic: Cranial nerves II-XII grossly intact and symmetric bilaterally.   Head and Face:   Head: Atraumatic with no masses, lesions or scarring.   Face: Normal symmetry, no paralysis, synkinesis or facial tic. No scars or deformities.   TMJ: Bilateral crepitus  Eyes: Conjunctiva not edematous or erythematous   Ears: External inspection of ears with no deformity, scars or masses. Bilateral EACs   Neck: Normal appearing, symmetric, trachea midline.   Cardiovascular: Examination of peripheral vascular system shows no clubbing or cyanosis.   Respiratory: No respiratory distress increased work of breathing. Inspection of the chest with symmetric chest expansion and normal respiratory effort.   Skin: No rashes in the head or neck    Bedside occulomotor function assessment for ocular pursuits and saccades, spontaneous nystagmus,  positional and postural testing (Romberg, Fukuta, head thrust, tandem gait) is normal.  My interpretation of the audiogram done 10/4/2023 is normal hearing through 6000 Hz sloping to mild sensorineural hearing loss bilaterally.  Excellent word recognition scores and normal tympanograms bilaterally.  Assessment/Plan     This patient presents for initial evaluation of acute acquired bilateral cerumen impaction, vertigo as well as chronic headaches, neck pain, neck stiffness, bilateral sensorineural hearing loss, TMJ dysfunction, bilateral tinnitus.    Reassurance given that otologic exam is normal after cleaning.  She had a significant amount of cerumen occluding both ear canals which may contribute to the severity of her tinnitus.  If so, this should significantly improve over the next 24 hours.  We discussed that chronic TMJ dysfunction and cervicogenic issues can also contribute to tinnitus.  The physiology of balance control was explained. The likely possible  etiologies were reviewed. I believe the patient does not have a peripheral vestibular disorder.  Her description of vertiginous episodes accompanied by severe headache are most consistent with migraine variant.  I recommended physical therapy for her chronic neck symptoms which may prevent migraines and improved tinnitus.  She was given an order for an outside physical therapist who specializes in cervicogenic issues as well as TMJ dysfunction.  Patient will discuss getting a new mouthguard with her dentist.  She was also given contact information for an outside specialist.  Lastly, she was given a list of foods known to trigger migraines.  Since her episodes occur rather far apart, I did not recommend an elimination diet at this point.  I also recommended oil-based drops to both ear canals twice per month to prevent dryness and future impaction.  She may follow-up with me as needed.  All questions were answered to patient's satisfaction.      45 minutes was spent on this patient´s visit. More than 50% of that time was spent in counseling regarding the possible etiologies, test results, treatment options and coordinating care.    This note was created using speech recognition transcription software. Despite proofreading, several typographical errors might be present that might affect the meaning of the content. Please call with any questions.    Patient ID: Oxana Rao is a 73 y.o. female.    Ear cerumen removal    Date/Time: 11/14/2023 10:01 AM    Performed by: BERTIN Higgins  Authorized by: BERTIN Higgins    Consent:     Consent obtained:  Verbal    Consent given by:  Patient    Risks discussed:  Pain    Alternatives discussed:  No treatment  Procedure details:     Location:  R ear and L ear    Procedure type comment:  Alligator and right angle hook    Procedure outcomes: cerumen removed    Post-procedure details:     Inspection:  No bleeding, ear canal clear and TM intact    Hearing  quality:  Normal    Procedure completion:  Tolerated well, no immediate complications

## 2023-11-30 ENCOUNTER — OFFICE VISIT (OUTPATIENT)
Dept: UROLOGY | Facility: CLINIC | Age: 73
End: 2023-11-30
Payer: MEDICARE

## 2023-11-30 VITALS
BODY MASS INDEX: 32.03 KG/M2 | WEIGHT: 203 LBS | DIASTOLIC BLOOD PRESSURE: 83 MMHG | HEART RATE: 92 BPM | SYSTOLIC BLOOD PRESSURE: 129 MMHG

## 2023-11-30 DIAGNOSIS — R10.2 PELVIC PAIN: Primary | ICD-10-CM

## 2023-11-30 DIAGNOSIS — N39.41 URGE INCONTINENCE: ICD-10-CM

## 2023-11-30 DIAGNOSIS — N95.2 ATROPHIC VAGINITIS: ICD-10-CM

## 2023-11-30 LAB
POC APPEARANCE, URINE: CLEAR
POC BILIRUBIN, URINE: NEGATIVE
POC BLOOD, URINE: NEGATIVE
POC COLOR, URINE: YELLOW
POC GLUCOSE, URINE: NEGATIVE MG/DL
POC KETONES, URINE: NEGATIVE MG/DL
POC LEUKOCYTES, URINE: ABNORMAL
POC NITRITE,URINE: NEGATIVE
POC PH, URINE: 6.5 PH
POC PROTEIN, URINE: NEGATIVE MG/DL
POC SPECIFIC GRAVITY, URINE: 1.01
POC UROBILINOGEN, URINE: 0.2 EU/DL

## 2023-11-30 PROCEDURE — 1159F MED LIST DOCD IN RCRD: CPT | Performed by: OBSTETRICS & GYNECOLOGY

## 2023-11-30 PROCEDURE — 3074F SYST BP LT 130 MM HG: CPT | Performed by: OBSTETRICS & GYNECOLOGY

## 2023-11-30 PROCEDURE — 81003 URINALYSIS AUTO W/O SCOPE: CPT | Performed by: OBSTETRICS & GYNECOLOGY

## 2023-11-30 PROCEDURE — 3079F DIAST BP 80-89 MM HG: CPT | Performed by: OBSTETRICS & GYNECOLOGY

## 2023-11-30 PROCEDURE — 1036F TOBACCO NON-USER: CPT | Performed by: OBSTETRICS & GYNECOLOGY

## 2023-11-30 PROCEDURE — 1160F RVW MEDS BY RX/DR IN RCRD: CPT | Performed by: OBSTETRICS & GYNECOLOGY

## 2023-11-30 PROCEDURE — 99213 OFFICE O/P EST LOW 20 MIN: CPT | Performed by: OBSTETRICS & GYNECOLOGY

## 2023-11-30 PROCEDURE — 1125F AMNT PAIN NOTED PAIN PRSNT: CPT | Performed by: OBSTETRICS & GYNECOLOGY

## 2023-11-30 NOTE — PROGRESS NOTES
Subjective   Patient ID: Oxana Rao is a 73 y.o. female who presents for 100 units of intradetrusor Botox.  HPI   73-year-old with history of exquisite pelvic floor pain, multiple mid urethral sling and vaginal surgeries, urge urinary incontinence, and vaginal atrophy having undergone 100 units of Botox  11/10/2023     The patient appears to be noting benefits in her lower urinary tract complaints with her recent intradetrusor Botox. PVR today si 150 ml. She does continues to note rare episodes of incontinence. She notes 1-2 episodes of nocturia but denies any enuresis. She is urinating 3 times daily. She denies any UTI like symptoms.     She denies any bowel related complaints, no fecal or flatal incontinence.    She denies any vaginal complaints, no abnormal bleeding or discharge. She denies any pelvic pain.    She has no other complaints.    From Previous note  73-year-old with history of exquisite pelvic floor pain, multiple mid urethral sling and vaginal surgeries, urge urinary incontinence, and vaginal atrophy having undergone 100 units of Botox today 11/10/2023     She has no other complaints.     From Previous note  This visit was performed through telemedicine  73-year-old with history of exquisite pelvic floor pain, multiple mid urethral sling and vaginal surgeries, urge urinary incontinence, and vaginal atrophy presenting to discuss urodynamics.     She has no new complaints.     From previous note  73-year-old with history of exquisite pelvic floor pain, multiple mid urethral sling and vaginal surgeries, urge urinary incontinence, and vaginal atrophy.     The patient notes worsening lower urinary tract complaints. .She notes 3-4 episodes of nocturia and has to rush to the bathroom. She voids 3-5 times during the day with episodes of incontinence in between. She does also note urinary leakage on movement. She has to wear depends to avoid accidents. She has also followed up with PFPT with no benefits.  She denies any UTI likes symptoms today.     She denies any vaginal complaints, no abnormal vaginal bleeding or discharge.      She denies any bowel related complaints, no fecal or flatal incontinence.      She has no other complaints.            From previous note  72-year-old with history of exquisite pelvic floor pain, multiple mid urethral sling and vaginal surgeries, urge urinary incontinence, and vaginal atrophy.     The patient was last seen in August 2022. The patient complaints or worsening urinary urgency, she states she need to void as soon as she sees a bathroom. She utilizes pads to avoid accidents as she leaks. She denies any UTI like symptoms. She is not continuing her pelvic floor physical therapy. She is not utilizing the vaginal estrogen cream.     She denies any vaginal complaints, no abnormal vaginal bleeding or discharge.     She denies any bowel related complaints, no fecal or flatal incontinence.     She has no other complaints.     From previous note  72-year-old with history of exquisite pelvic floor pain, multiple mid urethral sling and vaginal surgeries, urge urinary incontinence, and vaginal atrophy presenting for follow-up.     The patient is following up with pelvic floor physical therapy and is overall very satisfied with this. She did need to miss recent appointment due to bronchitis complaints. However she wishes to continue this moving forward.     She was previously recommended to start amitriptyline which she has not done. She was also counseled regarding her headache complaints to start this medication as well. She desires a new written prescription for this.     [facility] did not provide her urodynamic reports after multiple attempts. She does state that she will provide this at her earliest convenience.     She has no new complaints.     From previous note  72-year-old presenting as a referral from Dr. Park with complicated surgical pelvic floor history and urinary incontinence  complaints.     The patient underwent a hysterectomy many years ago. In 1997 she believes she underwent an anterior colporrhaphy. In 2005 and 6 she believes she had a sling placed and again in 2014 another sling was placed. In 2019 she believes she may have had a third sling placed. In 2021 Dr. Maliha Benitez excised or released the sling. She denies any vaginal bulge symptoms and does not believe that any her prior surgeries was performed for this except that in 1997. She states that her surgeries performed in 2005, 2014, and 2019 were to help with her urinary incontinence complaints. Prior to her 2021 surgery, she had noted pain with urination and she believes that the sling was obstructive. Since that time she denies any pain.     She does note episodic leaking with activity and during intercourse. She otherwise denies leaking with coughing or sneezing. She does feel significant urgency roughly every 2 hours. She notes 2-3 episodes of nocturia and denies enuresis. She has previously utilized oxybutynin and most recently Myrbetriq without improvement in her symptoms. She denies a history of nephrolithiasis, chronic urinary tract infections or any gross hematuria. She has previously been recommended to undergo pelvic floor physical therapy but has not followed up with this.     She otherwise has not been able to be sexually active in the last 2 to 3 years due to significant pain vaginally. She otherwise denies any abnormal vaginal bleeding or discharge. She does note vaginal dryness.     Patient denies any constipation complaints. She denies any fecal incontinence or flatal incontinence.     She has no other complaints.      Review of Systems  Constitutional: No fever, No chills and No fatigue.   Eyes: No vision problems and No dryness of the eyes.   ENT: No dry mouth, No hearing loss and No nosebleeds.   Cardiovascular: No chest pain, No palpitations and No orthopnea.   Respiratory: No shortness of breath, No cough and  No wheezing.   Gastrointestinal: No abdominal pain, No constipation, No nausea, No diarrhea, No vomiting and No melena.   Genitourinary: As noted in HPI.   Musculoskeletal: No back pain, No myalgias, No muscle weakness, No joint swelling and No leg edema.   Integumentary: No rashes, No skin lesion and No itching.   Neurological: No headache, No numbness and No dizziness.   Psychiatric: No sleep disturbances, No anxiety and No depression.   Endocrine: No hot flashes, No loss of hair and No hirsutism.   Hematologic/Lymphatic: No swollen glands, No tendency for easy bleeding and No tendency for easy bruising.   All other systems have been reviewed and are negative for complaint.        Objective   Physical Exam      PHYSICAL EXAMINATION:  No LMP recorded.  Body mass index is 32.03 kg/m².  /83   Pulse 92   Wt 92.1 kg (203 lb)   BMI 32.03 kg/m²   General Appearance: well appearing  Neuro: Alert and oriented   HEENT: mucous membranes moist, neck supple  Resp: No respiratory distress, normal work of breathing  MSK: normal range of motion, gait appropriate      Assessment/Plan      73-year-old with history of exquisite pelvic floor pain, multiple mid urethral sling and vaginal surgeries, urge urinary incontinence, and vaginal atrophy having undergone 100 units of Botox 11/10/2023      #1 patient appears to be having excellent results following her 11/10/2023 Botox.  We discussed the importance of timed voiding every 3-4 hours during the daytime.  We discussed appropriate positioning on the toilet and utilizing a squatty potty.  We have previously discussed her urodynamics. It was notable for hypersensitivity with normal compliance and emptying without having evidence of obstruction from her prior mid urethral slings. We have been unable to obtain her records from [facility] unfortunately. She has not had any benefits to her lower urinary tract symptoms with anticholinergic and beta 3 agonist therapies in the past.  However she has been noted to have exquisitely tight and tender pelvic floor muscles. She was previously having excellent results with her pelvic floor physical therapy. Unfortunately she has not noted any significant benefits with her recent pelvic floor physical therapy. We again discussed the possibility of Valium therapy in the future and she is not interested in this at this time. She is not interested in utilizing Gemtesa as this is cost prohibitive to her.  She appears to be having excellent results with her Botox and we will continue this moving forward.    #2 we again discussed our lower urinary tract symptoms may be related to her exquisite pelvic floor pain. The patient has previously utilized amitriptyline with minimal benefit. We will readdress this at follow-up appointments.     3. We discussed the patient's vaginal atrophy. We discussed the safety, efficacy, proper utilization of vaginal estrogen therapy and she we will continue this 3 times a week moving forward.     #4 the patient will follow-up in 3 months to discuss her lower urinary tract symptoms     JALEN Costa MD     Scribe Attestation  By signing my name below, IAyesha Scribe attest that this documentation has been prepared under the direction and in the presence of Henry Costa MD. All medical record entries made by the Scribe were at my direction or personally dictated by me. I have reviewed the chart and agree that the record accurately reflects my personal performance of the history, physical exam, discussion and plan.

## 2023-11-30 NOTE — PATIENT INSTRUCTIONS
Please follow-up virtually in 3 months to discuss her lower urinary tract symptoms.    Please contact the clinic with any worsening lower urinary tract complaints in the meantime.    [phone].

## 2024-01-31 ENCOUNTER — HOSPITAL ENCOUNTER (EMERGENCY)
Facility: HOSPITAL | Age: 74
Discharge: HOME | End: 2024-01-31
Attending: STUDENT IN AN ORGANIZED HEALTH CARE EDUCATION/TRAINING PROGRAM
Payer: COMMERCIAL

## 2024-01-31 VITALS
TEMPERATURE: 98.8 F | OXYGEN SATURATION: 99 % | DIASTOLIC BLOOD PRESSURE: 80 MMHG | RESPIRATION RATE: 14 BRPM | HEIGHT: 66 IN | WEIGHT: 193 LBS | BODY MASS INDEX: 31.02 KG/M2 | SYSTOLIC BLOOD PRESSURE: 122 MMHG | HEART RATE: 85 BPM

## 2024-01-31 DIAGNOSIS — J02.9 VIRAL PHARYNGITIS: Primary | ICD-10-CM

## 2024-01-31 DIAGNOSIS — H92.02 LEFT EAR PAIN: ICD-10-CM

## 2024-01-31 DIAGNOSIS — R09.81 NASAL CONGESTION: ICD-10-CM

## 2024-01-31 LAB
FLUAV RNA RESP QL NAA+PROBE: NOT DETECTED
FLUBV RNA RESP QL NAA+PROBE: NOT DETECTED
RSV RNA RESP QL NAA+PROBE: NOT DETECTED
S PYO DNA THROAT QL NAA+PROBE: NOT DETECTED
SARS-COV-2 RNA RESP QL NAA+PROBE: NOT DETECTED

## 2024-01-31 PROCEDURE — 99283 EMERGENCY DEPT VISIT LOW MDM: CPT | Performed by: STUDENT IN AN ORGANIZED HEALTH CARE EDUCATION/TRAINING PROGRAM

## 2024-01-31 PROCEDURE — 87637 SARSCOV2&INF A&B&RSV AMP PRB: CPT | Performed by: STUDENT IN AN ORGANIZED HEALTH CARE EDUCATION/TRAINING PROGRAM

## 2024-01-31 PROCEDURE — 87651 STREP A DNA AMP PROBE: CPT | Performed by: STUDENT IN AN ORGANIZED HEALTH CARE EDUCATION/TRAINING PROGRAM

## 2024-01-31 PROCEDURE — 2500000001 HC RX 250 WO HCPCS SELF ADMINISTERED DRUGS (ALT 637 FOR MEDICARE OP): Performed by: STUDENT IN AN ORGANIZED HEALTH CARE EDUCATION/TRAINING PROGRAM

## 2024-01-31 PROCEDURE — 2500000004 HC RX 250 GENERAL PHARMACY W/ HCPCS (ALT 636 FOR OP/ED): Performed by: STUDENT IN AN ORGANIZED HEALTH CARE EDUCATION/TRAINING PROGRAM

## 2024-01-31 RX ORDER — PSEUDOEPHEDRINE HCL 30 MG
30 TABLET ORAL EVERY 4 HOURS PRN
Qty: 30 TABLET | Refills: 0 | Status: SHIPPED | OUTPATIENT
Start: 2024-01-31 | End: 2024-05-13 | Stop reason: ALTCHOICE

## 2024-01-31 RX ORDER — ACETAMINOPHEN 500 MG
1000 TABLET ORAL ONCE
Status: COMPLETED | OUTPATIENT
Start: 2024-01-31 | End: 2024-01-31

## 2024-01-31 RX ORDER — DEXAMETHASONE 6 MG/1
6 TABLET ORAL ONCE
Status: COMPLETED | OUTPATIENT
Start: 2024-01-31 | End: 2024-01-31

## 2024-01-31 RX ORDER — PROMETHAZINE HYDROCHLORIDE AND DEXTROMETHORPHAN HYDROBROMIDE 6.25; 15 MG/5ML; MG/5ML
5 SYRUP ORAL 4 TIMES DAILY PRN
Qty: 118 ML | Refills: 0 | Status: SHIPPED | OUTPATIENT
Start: 2024-01-31 | End: 2024-02-07

## 2024-01-31 RX ADMIN — DEXAMETHASONE 6 MG: 6 TABLET ORAL at 09:30

## 2024-01-31 RX ADMIN — ACETAMINOPHEN 1000 MG: 500 TABLET ORAL at 09:01

## 2024-01-31 ASSESSMENT — COLUMBIA-SUICIDE SEVERITY RATING SCALE - C-SSRS
1. IN THE PAST MONTH, HAVE YOU WISHED YOU WERE DEAD OR WISHED YOU COULD GO TO SLEEP AND NOT WAKE UP?: NO
2. HAVE YOU ACTUALLY HAD ANY THOUGHTS OF KILLING YOURSELF?: NO
6. HAVE YOU EVER DONE ANYTHING, STARTED TO DO ANYTHING, OR PREPARED TO DO ANYTHING TO END YOUR LIFE?: NO
1. IN THE PAST MONTH, HAVE YOU WISHED YOU WERE DEAD OR WISHED YOU COULD GO TO SLEEP AND NOT WAKE UP?: NO
6. HAVE YOU EVER DONE ANYTHING, STARTED TO DO ANYTHING, OR PREPARED TO DO ANYTHING TO END YOUR LIFE?: NO
2. HAVE YOU ACTUALLY HAD ANY THOUGHTS OF KILLING YOURSELF?: NO

## 2024-01-31 ASSESSMENT — LIFESTYLE VARIABLES
HAVE YOU EVER FELT YOU SHOULD CUT DOWN ON YOUR DRINKING: NO
EVER HAD A DRINK FIRST THING IN THE MORNING TO STEADY YOUR NERVES TO GET RID OF A HANGOVER: NO
HAVE PEOPLE ANNOYED YOU BY CRITICIZING YOUR DRINKING: NO
EVER FELT BAD OR GUILTY ABOUT YOUR DRINKING: NO

## 2024-01-31 NOTE — DISCHARGE INSTRUCTIONS
You were seen in the Emergency department today for ear pain and throat pain.  At this time you do not need to stay in the hospital.  Your viral testing today was negative and your strep swab today was negative.  I prescribed a medication to use as needed for cough and for congestion.  If you have any other concerns you can return emergency department for recheck.

## 2024-01-31 NOTE — ED PROVIDER NOTES
HPI   Chief Complaint   Patient presents with    Congestion, Aches        73-year-old female presents with nasal congestion.  Patient states that she started with increased sneezing and nasal congestion yesterday, gradually worsening.  Patient states that she now has increased nasal drainage, sore throat, left ear pain.  Notes increased cough.  No fevers.  No recent sick contacts.  Patient is vaccinated against RSV and COVID.                          [place] Coma Scale Score: 15                  Patient History   Past Medical History:   Diagnosis Date    Asthma     Diverticulitis     GERD (gastroesophageal reflux disease)     HTN (hypertension)      Past Surgical History:   Procedure Laterality Date    APPENDECTOMY  01/08/2016    Appendectomy    APPENDECTOMY      BUNIONECTOMY      HERNIA REPAIR  01/08/2016    Hernia Repair    HIATAL HERNIA REPAIR      HYSTERECTOMY  01/08/2016    Hysterectomy    HYSTERECTOMY      INCONTINENCE SURGERY      KNEE      total knee replacement    OTHER SURGICAL HISTORY  01/08/2016    Bunion Correction With Metatarsal Osteotomy    OTHER SURGICAL HISTORY  01/08/2016    Wrist Arthroplasty    OTHER SURGICAL HISTORY  05/18/2022    Urethropexy    SHOULDER SURGERY  01/08/2016    Shoulder Surgery    TOTAL KNEE ARTHROPLASTY  01/08/2016    Knee Replacement     Family History   Problem Relation Name Age of Onset    Heart attack Mother      Heart disease Mother      Hypertension Mother      Stroke Mother      Asthma Mother      Hypertension Sister      Heart attack Brother      Other (lung/prostate/breast) Brother      Heart disease Brother      Hypertension Brother      Stroke Brother      Asthma Brother      Cancer Brother      Cancer Brother      Asthma Daughter       Social History     Tobacco Use    Smoking status: Never    Smokeless tobacco: Never   Substance Use Topics    Alcohol use: Yes    Drug use: Never       Physical Exam   ED Triage Vitals [01/31/24 0746]   Temperature Heart Rate  Respirations BP   37.1 °C (98.8 °F) 85 14 122/80      Pulse Ox Temp src Heart Rate Source Patient Position   99 % -- -- --      BP Location FiO2 (%)     -- --       Physical Exam  Vitals and nursing note reviewed.   Constitutional:       General: She is not in acute distress.     Appearance: She is not ill-appearing.   HENT:      Head: Normocephalic and atraumatic.      Right Ear: Tympanic membrane normal.      Left Ear: A middle ear effusion is present. Tympanic membrane is not erythematous or bulging.      Mouth/Throat:      Mouth: Mucous membranes are moist.      Pharynx: Oropharynx is clear.   Eyes:      Extraocular Movements: Extraocular movements intact.      Conjunctiva/sclera: Conjunctivae normal.      Pupils: Pupils are equal, round, and reactive to light.   Cardiovascular:      Rate and Rhythm: Normal rate and regular rhythm.   Pulmonary:      Effort: Pulmonary effort is normal. No respiratory distress.      Breath sounds: Normal breath sounds.   Abdominal:      General: There is no distension.      Palpations: Abdomen is soft.      Tenderness: There is no abdominal tenderness.   Musculoskeletal:         General: No swelling or deformity. Normal range of motion.      Cervical back: Normal range of motion and neck supple.   Skin:     General: Skin is warm and dry.      Capillary Refill: Capillary refill takes less than 2 seconds.   Neurological:      General: No focal deficit present.      Mental Status: She is alert and oriented to person, place, and time. Mental status is at baseline.   Psychiatric:         Mood and Affect: Mood normal.         Behavior: Behavior normal.         ED Course & MDM   ED Course as of 01/31/24 1111   Wed Jan 31, 2024   0911 Group A Strep PCR: Not Detected []   0929 Flu A Result: Not Detected []   0929 Flu B Result: Not Detected []   0929 Coronavirus 2019, PCR: Not Detected []   0930 RSV PCR: Not Detected []      ED Course User Index  [] Kimberly Grady MD          Diagnoses as of 01/31/24 1111   Viral pharyngitis   Left ear pain   Nasal congestion       Medical Decision Making  73 y.o. female presents with sore throat and nasal congestion, most likely d/t acute viral syndrome given laboratory & historical and physical exam features.  I have considered the following conditions in my assessment of this patient's cough: Bronchitis, bronchiolitis, pneumonia, bronchospasm/asthma, croup, pertussis, COPD, irritant cough (ie.  post-nasal drip, fume inhalation, allergens), medication side effect (ie ACE inhibitors), viral upper respiratory infection, influenza, pulmonary embolism, congestive heart failure, pulmonary edema, GERD, foreign body aspiration/obstruction, malignancy.  Negative for influenza, COVID, RSV.  Negative for strep.  Patient is well-appearing after supportive measures, no peritoneal or meningeal signs.  At this time is stable, with normal vitals.  No further indication for urgent/emergent workup or management and is appropriate for d/c home.          Procedure  Procedures     Kimberly Grady MD  01/31/24 6769

## 2024-02-05 ENCOUNTER — TELEMEDICINE (OUTPATIENT)
Dept: PRIMARY CARE | Facility: CLINIC | Age: 74
End: 2024-02-05
Payer: COMMERCIAL

## 2024-02-05 DIAGNOSIS — J06.9 UPPER RESPIRATORY TRACT INFECTION, UNSPECIFIED TYPE: Primary | ICD-10-CM

## 2024-02-05 DIAGNOSIS — Z76.0 MEDICATION REFILL: ICD-10-CM

## 2024-02-05 PROCEDURE — 99442 PR PHYS/QHP TELEPHONE EVALUATION 11-20 MIN: CPT | Performed by: INTERNAL MEDICINE

## 2024-02-05 PROCEDURE — 1125F AMNT PAIN NOTED PAIN PRSNT: CPT | Performed by: INTERNAL MEDICINE

## 2024-02-05 PROCEDURE — 1157F ADVNC CARE PLAN IN RCRD: CPT | Performed by: INTERNAL MEDICINE

## 2024-02-05 PROCEDURE — 1159F MED LIST DOCD IN RCRD: CPT | Performed by: INTERNAL MEDICINE

## 2024-02-05 PROCEDURE — 1036F TOBACCO NON-USER: CPT | Performed by: INTERNAL MEDICINE

## 2024-02-05 RX ORDER — TIZANIDINE 4 MG/1
4 TABLET ORAL 3 TIMES DAILY PRN
Qty: 30 TABLET | Refills: 1 | Status: SHIPPED | OUTPATIENT
Start: 2024-02-05 | End: 2024-04-02 | Stop reason: ALTCHOICE

## 2024-02-05 RX ORDER — AZITHROMYCIN 250 MG/1
TABLET, FILM COATED ORAL
Qty: 6 TABLET | Refills: 0 | Status: SHIPPED | OUTPATIENT
Start: 2024-02-05 | End: 2024-02-10

## 2024-02-05 RX ORDER — BENZONATATE 200 MG/1
200 CAPSULE ORAL 3 TIMES DAILY PRN
Qty: 30 CAPSULE | Refills: 0 | Status: SHIPPED | OUTPATIENT
Start: 2024-02-05 | End: 2024-02-07 | Stop reason: SDUPTHER

## 2024-02-05 ASSESSMENT — ENCOUNTER SYMPTOMS
HEADACHES: 1
WEIGHT LOSS: 0
CHILLS: 0
FEVER: 0
SWEATS: 0
WHEEZING: 0
COUGH: 1
HEMOPTYSIS: 0
SORE THROAT: 1
SHORTNESS OF BREATH: 0
RHINORRHEA: 1

## 2024-02-05 NOTE — PROGRESS NOTES
Subjective   Patient ID: Oxana Rao is a 73 y.o. female who presents for Cough (CONGESTION/HEADACHES/).    Cough  This is a new problem. The current episode started in the past 7 days. The problem has been gradually worsening. The problem occurs every few minutes. The cough is Productive of brown sputum. Associated symptoms include headaches, nasal congestion, rhinorrhea and a sore throat. Pertinent negatives include no chest pain, chills, ear congestion, ear pain, fever, hemoptysis, shortness of breath, sweats, weight loss or wheezing. Nothing aggravates the symptoms. She has tried prescription cough suppressant for the symptoms. The treatment provided no relief.        Review of Systems   Constitutional:  Negative for chills, fever and weight loss.   HENT:  Positive for rhinorrhea and sore throat. Negative for ear pain.    Respiratory:  Positive for cough. Negative for hemoptysis, shortness of breath and wheezing.    Cardiovascular:  Negative for chest pain.   Neurological:  Positive for headaches.       Objective   There were no vitals taken for this visit.    Physical Exam  HENT:      Nose: Nose normal.      Mouth/Throat:      Comments: Voice is hoarse  Pulmonary:      Effort: Pulmonary effort is normal.   Neurological:      Mental Status: She is alert and oriented to person, place, and time.   Psychiatric:         Mood and Affect: Mood normal.         Assessment/Plan   Diagnoses and all orders for this visit:  Upper respiratory tract infection, unspecified type  Comments:  ER records reviewed  Orders:  -     azithromycin (Zithromax) 250 mg tablet; Take 2 tablets (500 mg) by mouth once daily for 1 day, THEN 1 tablet (250 mg) once daily for 4 days. Take 2 tabs (500 mg) by mouth today, than 1 daily for 4 days..  -     benzonatate (Tessalon) 200 mg capsule; Take 1 capsule (200 mg) by mouth 3 times a day as needed for cough. Do not crush or chew.  Medication refill  -     tiZANidine (Zanaflex) 4 mg tablet; Take  1 tablet (4 mg) by mouth 3 times a day as needed for muscle spasms.

## 2024-02-07 ENCOUNTER — TELEPHONE (OUTPATIENT)
Dept: PRIMARY CARE | Facility: CLINIC | Age: 74
End: 2024-02-07
Payer: COMMERCIAL

## 2024-02-07 DIAGNOSIS — J06.9 UPPER RESPIRATORY TRACT INFECTION, UNSPECIFIED TYPE: ICD-10-CM

## 2024-02-07 RX ORDER — BENZONATATE 200 MG/1
200 CAPSULE ORAL 3 TIMES DAILY PRN
Qty: 30 CAPSULE | Refills: 0 | Status: SHIPPED | OUTPATIENT
Start: 2024-02-07 | End: 2024-02-12 | Stop reason: SDUPTHER

## 2024-02-07 NOTE — TELEPHONE ENCOUNTER
Patient called the office - requesting benzonatate to be sent to [facility] because it will be $17 instead of $30 - medication and pharmacy populated. She is requesting a call back once this is done.

## 2024-02-07 NOTE — TELEPHONE ENCOUNTER
Pt left voicemail requesting a call back. Wants an rx sent to [facility]. Did not specify which [facility] or rx she is requesting.

## 2024-02-09 ENCOUNTER — TELEMEDICINE (OUTPATIENT)
Dept: PRIMARY CARE | Facility: CLINIC | Age: 74
End: 2024-02-09
Payer: COMMERCIAL

## 2024-02-09 DIAGNOSIS — J06.9 VIRAL UPPER RESPIRATORY TRACT INFECTION: Primary | ICD-10-CM

## 2024-02-09 DIAGNOSIS — Z12.31 BREAST CANCER SCREENING BY MAMMOGRAM: ICD-10-CM

## 2024-02-09 PROCEDURE — 1036F TOBACCO NON-USER: CPT | Performed by: INTERNAL MEDICINE

## 2024-02-09 PROCEDURE — 1125F AMNT PAIN NOTED PAIN PRSNT: CPT | Performed by: INTERNAL MEDICINE

## 2024-02-09 PROCEDURE — 1157F ADVNC CARE PLAN IN RCRD: CPT | Performed by: INTERNAL MEDICINE

## 2024-02-09 PROCEDURE — 1159F MED LIST DOCD IN RCRD: CPT | Performed by: INTERNAL MEDICINE

## 2024-02-09 PROCEDURE — 99442 PR PHYS/QHP TELEPHONE EVALUATION 11-20 MIN: CPT | Performed by: INTERNAL MEDICINE

## 2024-02-09 RX ORDER — FLUTICASONE PROPIONATE 50 MCG
1 SPRAY, SUSPENSION (ML) NASAL DAILY
Qty: 16 G | Refills: 1 | Status: SHIPPED | OUTPATIENT
Start: 2024-02-09 | End: 2025-02-08

## 2024-02-09 RX ORDER — CETIRIZINE HYDROCHLORIDE, PSEUDOEPHEDRINE HYDROCHLORIDE 5; 120 MG/1; MG/1
1 TABLET, FILM COATED, EXTENDED RELEASE ORAL 2 TIMES DAILY PRN
Qty: 30 TABLET | Refills: 0 | Status: SHIPPED | OUTPATIENT
Start: 2024-02-09 | End: 2024-05-13 | Stop reason: ALTCHOICE

## 2024-02-09 ASSESSMENT — PATIENT HEALTH QUESTIONNAIRE - PHQ9
2. FEELING DOWN, DEPRESSED OR HOPELESS: NOT AT ALL
SUM OF ALL RESPONSES TO PHQ9 QUESTIONS 1 AND 2: 0
1. LITTLE INTEREST OR PLEASURE IN DOING THINGS: NOT AT ALL

## 2024-02-09 NOTE — PROGRESS NOTES
Subjective   Patient ID: Oxana Rao is a 73 y.o. female who presents for Follow-up.    This is a 73 y.o. presenting with continued illness for almost 2 weeks. She has left ear pain, hoarseness, rhinorrhea and HA. Her cough is still present, but improving with Benzonatate. She completed ATBx. Her strep, flu, Covid and RSV tests were negative in the ER.          Review of Systems   All other systems reviewed and are negative.      Objective   There were no vitals taken for this visit.    Physical Exam  Constitutional:       General: She is not in acute distress.  HENT:      Head:      Comments: Voice is hoarse  Pulmonary:      Effort: Pulmonary effort is normal. No respiratory distress.   Neurological:      Mental Status: She is alert and oriented to person, place, and time.   Psychiatric:         Mood and Affect: Mood normal.         Assessment/Plan   Diagnoses and all orders for this visit:  Viral upper respiratory tract infection  -     cetirizine-pseudoephedrine (ZyrTEC-D) 5-120 mg 12 hr tablet; Take 1 tablet by mouth 2 times a day as needed for allergies.  -     fluticasone (Flonase) 50 mcg/actuation nasal spray; Administer 1 spray into each nostril once daily. Shake gently. Before first use, prime pump. After use, clean tip and replace cap.  Breast cancer screening by mammogram  -     BI mammo bilateral screening tomosynthesis; Future

## 2024-02-12 DIAGNOSIS — J06.9 UPPER RESPIRATORY TRACT INFECTION, UNSPECIFIED TYPE: ICD-10-CM

## 2024-02-12 RX ORDER — BENZONATATE 200 MG/1
200 CAPSULE ORAL 3 TIMES DAILY PRN
Qty: 30 CAPSULE | Refills: 0 | Status: SHIPPED | OUTPATIENT
Start: 2024-02-12 | End: 2024-05-13 | Stop reason: ALTCHOICE

## 2024-02-12 NOTE — TELEPHONE ENCOUNTER
PT states that Dr. Gill sent RX to pharmacy last week for her cough.  PT states that she believes she left the RX in the cart at the pharmacy.  She has looked all over her house and in garbage to look for RX, but cannot find it.  PT wondering if new RX could be sent to the pharmacy. [facility] [address] in [place]

## 2024-02-21 ENCOUNTER — HOSPITAL ENCOUNTER (OUTPATIENT)
Dept: RADIOLOGY | Facility: CLINIC | Age: 74
Discharge: HOME | End: 2024-02-21
Payer: COMMERCIAL

## 2024-02-21 DIAGNOSIS — Z12.31 BREAST CANCER SCREENING BY MAMMOGRAM: ICD-10-CM

## 2024-02-21 PROCEDURE — 77063 BREAST TOMOSYNTHESIS BI: CPT | Performed by: RADIOLOGY

## 2024-02-21 PROCEDURE — 77067 SCR MAMMO BI INCL CAD: CPT | Performed by: RADIOLOGY

## 2024-02-21 PROCEDURE — 77067 SCR MAMMO BI INCL CAD: CPT

## 2024-02-23 ENCOUNTER — HOSPITAL ENCOUNTER (OUTPATIENT)
Dept: RADIOLOGY | Facility: EXTERNAL LOCATION | Age: 74
Discharge: HOME | End: 2024-02-23

## 2024-02-27 NOTE — PROGRESS NOTES
Subjective   Patient ID: Oxana Rao is a 73 y.o. female who presents for 100 units of intradetrusor Botox.  HPI  This visit was performed through telemedicine   73-year-old with history of exquisite pelvic floor pain, multiple mid urethral sling and vaginal surgeries, urge urinary incontinence, and vaginal atrophy having undergone 100 units of Botox 11/10/2023     Since her last visit with us she has had improvement in her urinary symptoms. She does still feel urgency every 3-4 hours. She notes that there are times where she has not been waking up at night as much but she does watch how much she drinks.  She does feel that it is time for repeat 100 units Botox.  She wishes to have this performed at her earliest convenience.    She has no other complaints.      From Previous note   73-year-old with history of exquisite pelvic floor pain, multiple mid urethral sling and vaginal surgeries, urge urinary incontinence, and vaginal atrophy having undergone 100 units of Botox  11/10/2023     The patient appears to be noting benefits in her lower urinary tract complaints with her recent intradetrusor Botox. PVR today si 150 ml. She does continues to note rare episodes of incontinence. She notes 1-2 episodes of nocturia but denies any enuresis. She is urinating 3 times daily. She denies any UTI like symptoms.     She denies any bowel related complaints, no fecal or flatal incontinence.    She denies any vaginal complaints, no abnormal bleeding or discharge. She denies any pelvic pain.    She has no other complaints.    From Previous note  73-year-old with history of exquisite pelvic floor pain, multiple mid urethral sling and vaginal surgeries, urge urinary incontinence, and vaginal atrophy having undergone 100 units of Botox today 11/10/2023     She has no other complaints.     From Previous note  This visit was performed through telemedicine  73-year-old with history of exquisite pelvic floor pain, multiple mid urethral  sling and vaginal surgeries, urge urinary incontinence, and vaginal atrophy presenting to discuss urodynamics.     She has no new complaints.     From previous note  73-year-old with history of exquisite pelvic floor pain, multiple mid urethral sling and vaginal surgeries, urge urinary incontinence, and vaginal atrophy.     The patient notes worsening lower urinary tract complaints. .She notes 3-4 episodes of nocturia and has to rush to the bathroom. She voids 3-5 times during the day with episodes of incontinence in between. She does also note urinary leakage on movement. She has to wear depends to avoid accidents. She has also followed up with PFPT with no benefits. She denies any UTI likes symptoms today.     She denies any vaginal complaints, no abnormal vaginal bleeding or discharge.      She denies any bowel related complaints, no fecal or flatal incontinence.      She has no other complaints.            From previous note  72-year-old with history of exquisite pelvic floor pain, multiple mid urethral sling and vaginal surgeries, urge urinary incontinence, and vaginal atrophy.     The patient was last seen in August 2022. The patient complaints or worsening urinary urgency, she states she need to void as soon as she sees a bathroom. She utilizes pads to avoid accidents as she leaks. She denies any UTI like symptoms. She is not continuing her pelvic floor physical therapy. She is not utilizing the vaginal estrogen cream.     She denies any vaginal complaints, no abnormal vaginal bleeding or discharge.     She denies any bowel related complaints, no fecal or flatal incontinence.     She has no other complaints.     From previous note  72-year-old with history of exquisite pelvic floor pain, multiple mid urethral sling and vaginal surgeries, urge urinary incontinence, and vaginal atrophy presenting for follow-up.     The patient is following up with pelvic floor physical therapy and is overall very satisfied with  this. She did need to miss recent appointment due to bronchitis complaints. However she wishes to continue this moving forward.     She was previously recommended to start amitriptyline which she has not done. She was also counseled regarding her headache complaints to start this medication as well. She desires a new written prescription for this.     [facility] did not provide her urodynamic reports after multiple attempts. She does state that she will provide this at her earliest convenience.     She has no new complaints.     From previous note  72-year-old presenting as a referral from Dr. Park with complicated surgical pelvic floor history and urinary incontinence complaints.     The patient underwent a hysterectomy many years ago. In 1997 she believes she underwent an anterior colporrhaphy. In 2005 and 6 she believes she had a sling placed and again in 2014 another sling was placed. In 2019 she believes she may have had a third sling placed. In 2021 Dr. Maliha Benitez excised or released the sling. She denies any vaginal bulge symptoms and does not believe that any her prior surgeries was performed for this except that in 1997. She states that her surgeries performed in 2005, 2014, and 2019 were to help with her urinary incontinence complaints. Prior to her 2021 surgery, she had noted pain with urination and she believes that the sling was obstructive. Since that time she denies any pain.     She does note episodic leaking with activity and during intercourse. She otherwise denies leaking with coughing or sneezing. She does feel significant urgency roughly every 2 hours. She notes 2-3 episodes of nocturia and denies enuresis. She has previously utilized oxybutynin and most recently Myrbetriq without improvement in her symptoms. She denies a history of nephrolithiasis, chronic urinary tract infections or any gross hematuria. She has previously been recommended to undergo pelvic floor physical therapy but has not  followed up with this.     She otherwise has not been able to be sexually active in the last 2 to 3 years due to significant pain vaginally. She otherwise denies any abnormal vaginal bleeding or discharge. She does note vaginal dryness.     Patient denies any constipation complaints. She denies any fecal incontinence or flatal incontinence.     She has no other complaints.      Review of Systems  Constitutional: No fever, No chills and No fatigue.   Eyes: No vision problems and No dryness of the eyes.   ENT: No dry mouth, No hearing loss and No nosebleeds.   Cardiovascular: No chest pain, No palpitations and No orthopnea.   Respiratory: No shortness of breath, No cough and No wheezing.   Gastrointestinal: No abdominal pain, No constipation, No nausea, No diarrhea, No vomiting and No melena.   Genitourinary: As noted in HPI.   Musculoskeletal: No back pain, No myalgias, No muscle weakness, No joint swelling and No leg edema.   Integumentary: No rashes, No skin lesion and No itching.   Neurological: No headache, No numbness and No dizziness.   Psychiatric: No sleep disturbances, No anxiety and No depression.   Endocrine: No hot flashes, No loss of hair and No hirsutism.   Hematologic/Lymphatic: No swollen glands, No tendency for easy bleeding and No tendency for easy bruising.   All other systems have been reviewed and are negative for complaint.        Objective   Physical Exam  This visit was performed through telemedicine          Assessment/Plan      73-year-old with history of exquisite pelvic floor pain, multiple mid urethral sling and vaginal surgeries, urge urinary incontinence, and vaginal atrophy having undergone 100 units of Botox 11/10/2023      #1 patient appears to be having excellent results following her 11/10/2023 Botox but does feel that these symptoms are worsening.  She denies any UTI symptoms today 2/29/2024.  We discussed the importance of timed voiding every 3-4 hours during the daytime.  We  discussed appropriate positioning on the toilet and utilizing a squatty potty.  We have previously discussed her urodynamics. It was notable for hypersensitivity with normal compliance and emptying without having evidence of obstruction from her prior mid urethral slings. We have been unable to obtain her records from [facility] unfortunately. She has not had any benefits to her lower urinary tract symptoms with anticholinergic and beta 3 agonist therapies in the past. However she has been noted to have exquisitely tight and tender pelvic floor muscles. She was previously having excellent results with her pelvic floor physical therapy. Unfortunately she has not noted any significant benefits with her recent pelvic floor physical therapy. We again discussed the possibility of Valium therapy in the future and she is not interested in this at this time. She is not interested in utilizing Gemtesa as this is cost prohibitive to her.  She will be scheduled at her earliest convenience for repeat 100 units Botox.    #2 we again discussed our lower urinary tract symptoms may be related to her exquisite pelvic floor pain. The patient has previously utilized amitriptyline with minimal benefit. We will readdress this at follow-up appointments.     3. We discussed the patient's vaginal atrophy. We discussed the safety, efficacy, proper utilization of vaginal estrogen therapy and she we will continue this 3 times a week moving forward.     #4 the patient will be scheduled at her earliest convenience for 100 units Botox at the [facility].     JALEN Costa MD     Scribe Attestation  By signing my name below, ISuzi Scribe attest that this documentation has been prepared under the direction and in the presence of Henry Costa MD. All medical record entries made by the Scribe were at my direction or personally dictated by me. I have reviewed the chart and agree that the record accurately reflects my personal  performance of the history, physical exam, discussion and plan.

## 2024-02-29 ENCOUNTER — TELEMEDICINE (OUTPATIENT)
Dept: UROLOGY | Facility: CLINIC | Age: 74
End: 2024-02-29
Payer: COMMERCIAL

## 2024-02-29 DIAGNOSIS — R10.2 PELVIC PAIN: ICD-10-CM

## 2024-02-29 DIAGNOSIS — N95.2 ATROPHIC VAGINITIS: ICD-10-CM

## 2024-02-29 DIAGNOSIS — N39.41 URGE INCONTINENCE: Primary | ICD-10-CM

## 2024-02-29 PROCEDURE — 1159F MED LIST DOCD IN RCRD: CPT | Performed by: OBSTETRICS & GYNECOLOGY

## 2024-02-29 PROCEDURE — 1157F ADVNC CARE PLAN IN RCRD: CPT | Performed by: OBSTETRICS & GYNECOLOGY

## 2024-02-29 PROCEDURE — 99441 PR PHYS/QHP TELEPHONE EVALUATION 5-10 MIN: CPT | Performed by: OBSTETRICS & GYNECOLOGY

## 2024-02-29 PROCEDURE — 1125F AMNT PAIN NOTED PAIN PRSNT: CPT | Performed by: OBSTETRICS & GYNECOLOGY

## 2024-02-29 PROCEDURE — 1036F TOBACCO NON-USER: CPT | Performed by: OBSTETRICS & GYNECOLOGY

## 2024-03-14 ENCOUNTER — TELEPHONE (OUTPATIENT)
Dept: UROLOGY | Facility: CLINIC | Age: 74
End: 2024-03-14

## 2024-03-21 ENCOUNTER — OFFICE VISIT (OUTPATIENT)
Dept: UROLOGY | Facility: CLINIC | Age: 74
End: 2024-03-21
Payer: COMMERCIAL

## 2024-03-21 VITALS
HEART RATE: 86 BPM | SYSTOLIC BLOOD PRESSURE: 144 MMHG | BODY MASS INDEX: 32.56 KG/M2 | DIASTOLIC BLOOD PRESSURE: 86 MMHG | WEIGHT: 201.7 LBS

## 2024-03-21 DIAGNOSIS — R10.2 PELVIC PAIN: ICD-10-CM

## 2024-03-21 DIAGNOSIS — N39.41 URGE INCONTINENCE: Primary | ICD-10-CM

## 2024-03-21 DIAGNOSIS — N95.2 ATROPHIC VAGINITIS: ICD-10-CM

## 2024-03-21 LAB
POC APPEARANCE, URINE: CLEAR
POC BILIRUBIN, URINE: NEGATIVE
POC BLOOD, URINE: NEGATIVE
POC COLOR, URINE: YELLOW
POC GLUCOSE, URINE: NEGATIVE MG/DL
POC KETONES, URINE: NEGATIVE MG/DL
POC LEUKOCYTES, URINE: ABNORMAL
POC NITRITE,URINE: NEGATIVE
POC PH, URINE: 5.5 PH
POC PROTEIN, URINE: NEGATIVE MG/DL
POC SPECIFIC GRAVITY, URINE: 1.02
POC UROBILINOGEN, URINE: 0.2 EU/DL

## 2024-03-21 PROCEDURE — 99213 OFFICE O/P EST LOW 20 MIN: CPT | Performed by: OBSTETRICS & GYNECOLOGY

## 2024-03-21 PROCEDURE — 1159F MED LIST DOCD IN RCRD: CPT | Performed by: OBSTETRICS & GYNECOLOGY

## 2024-03-21 PROCEDURE — 2500000002 HC RX 250 W HCPCS SELF ADMINISTERED DRUGS (ALT 637 FOR MEDICARE OP, ALT 636 FOR OP/ED): Performed by: OBSTETRICS & GYNECOLOGY

## 2024-03-21 PROCEDURE — 3077F SYST BP >= 140 MM HG: CPT | Performed by: OBSTETRICS & GYNECOLOGY

## 2024-03-21 PROCEDURE — 2500000004 HC RX 250 GENERAL PHARMACY W/ HCPCS (ALT 636 FOR OP/ED): Mod: JZ | Performed by: OBSTETRICS & GYNECOLOGY

## 2024-03-21 PROCEDURE — 96372 THER/PROPH/DIAG INJ SC/IM: CPT | Performed by: OBSTETRICS & GYNECOLOGY

## 2024-03-21 PROCEDURE — 1036F TOBACCO NON-USER: CPT | Performed by: OBSTETRICS & GYNECOLOGY

## 2024-03-21 PROCEDURE — 52287 CYSTOSCOPY CHEMODENERVATION: CPT | Performed by: OBSTETRICS & GYNECOLOGY

## 2024-03-21 PROCEDURE — 1160F RVW MEDS BY RX/DR IN RCRD: CPT | Performed by: OBSTETRICS & GYNECOLOGY

## 2024-03-21 PROCEDURE — 3079F DIAST BP 80-89 MM HG: CPT | Performed by: OBSTETRICS & GYNECOLOGY

## 2024-03-21 PROCEDURE — 1157F ADVNC CARE PLAN IN RCRD: CPT | Performed by: OBSTETRICS & GYNECOLOGY

## 2024-03-21 PROCEDURE — 81003 URINALYSIS AUTO W/O SCOPE: CPT | Mod: QW | Performed by: OBSTETRICS & GYNECOLOGY

## 2024-03-21 RX ORDER — NITROFURANTOIN 25; 75 MG/1; MG/1
100 CAPSULE ORAL ONCE
Status: COMPLETED | OUTPATIENT
Start: 2024-03-21 | End: 2024-03-21

## 2024-03-21 RX ORDER — NITROFURANTOIN 25; 75 MG/1; MG/1
100 CAPSULE ORAL 2 TIMES DAILY
Qty: 6 CAPSULE | Refills: 0 | Status: SHIPPED | OUTPATIENT
Start: 2024-03-21 | End: 2024-03-24

## 2024-03-21 RX ADMIN — NITROFURANTOIN (MONOHYDRATE/MACROCRYSTALS) 100 MG: 75; 25 CAPSULE ORAL at 09:35

## 2024-03-21 RX ADMIN — ONABOTULINUMTOXINA 100 UNITS: 100 INJECTION, POWDER, LYOPHILIZED, FOR SOLUTION INTRADERMAL; INTRAMUSCULAR at 09:33

## 2024-03-21 NOTE — PATIENT INSTRUCTIONS
Please follow-up in 5 to 6 months for repeat 100 units Botox.    Please  your 3-day course of Macrobid therapy at your local pharmacy.    Please contact the clinic should you have no improvements in your symptoms in 2 weeks.    Please contact the clinic with any questions or concerns.    [phone]

## 2024-03-21 NOTE — PROGRESS NOTES
Subjective   Patient ID: Oxana Rao is a 73 y.o. female who presents for 100 units of intradetrusor Botox.  HPI   73-year-old with history of exquisite pelvic floor pain, multiple mid urethral sling and vaginal surgeries, urge urinary incontinence, and vaginal atrophy having undergone 100 units of Botox 11/10/2023 and today 03/21/2024.       She has no other complaints.     From Previous note  This visit was performed through telemedicine   73-year-old with history of exquisite pelvic floor pain, multiple mid urethral sling and vaginal surgeries, urge urinary incontinence, and vaginal atrophy having undergone 100 units of Botox 11/10/2023     Since her last visit with us she has had improvement in her urinary symptoms. She does still feel urgency every 3-4 hours. She notes that there are times where she has not been waking up at night as much but she does watch how much she drinks.  She does feel that it is time for repeat 100 units Botox.  She wishes to have this performed at her earliest convenience.    She has no other complaints.      From Previous note   73-year-old with history of exquisite pelvic floor pain, multiple mid urethral sling and vaginal surgeries, urge urinary incontinence, and vaginal atrophy having undergone 100 units of Botox  11/10/2023     The patient appears to be noting benefits in her lower urinary tract complaints with her recent intradetrusor Botox. PVR today si 150 ml. She does continues to note rare episodes of incontinence. She notes 1-2 episodes of nocturia but denies any enuresis. She is urinating 3 times daily. She denies any UTI like symptoms.     She denies any bowel related complaints, no fecal or flatal incontinence.    She denies any vaginal complaints, no abnormal bleeding or discharge. She denies any pelvic pain.    She has no other complaints.    From Previous note  73-year-old with history of exquisite pelvic floor pain, multiple mid urethral sling and vaginal  surgeries, urge urinary incontinence, and vaginal atrophy having undergone 100 units of Botox today 11/10/2023     She has no other complaints.     From Previous note  This visit was performed through telemedicine  73-year-old with history of exquisite pelvic floor pain, multiple mid urethral sling and vaginal surgeries, urge urinary incontinence, and vaginal atrophy presenting to discuss urodynamics.     She has no new complaints.     From previous note  73-year-old with history of exquisite pelvic floor pain, multiple mid urethral sling and vaginal surgeries, urge urinary incontinence, and vaginal atrophy.     The patient notes worsening lower urinary tract complaints. .She notes 3-4 episodes of nocturia and has to rush to the bathroom. She voids 3-5 times during the day with episodes of incontinence in between. She does also note urinary leakage on movement. She has to wear depends to avoid accidents. She has also followed up with PFPT with no benefits. She denies any UTI likes symptoms today.     She denies any vaginal complaints, no abnormal vaginal bleeding or discharge.      She denies any bowel related complaints, no fecal or flatal incontinence.      She has no other complaints.            From previous note  72-year-old with history of exquisite pelvic floor pain, multiple mid urethral sling and vaginal surgeries, urge urinary incontinence, and vaginal atrophy.     The patient was last seen in August 2022. The patient complaints or worsening urinary urgency, she states she need to void as soon as she sees a bathroom. She utilizes pads to avoid accidents as she leaks. She denies any UTI like symptoms. She is not continuing her pelvic floor physical therapy. She is not utilizing the vaginal estrogen cream.     She denies any vaginal complaints, no abnormal vaginal bleeding or discharge.     She denies any bowel related complaints, no fecal or flatal incontinence.     She has no other complaints.     From  previous note  72-year-old with history of exquisite pelvic floor pain, multiple mid urethral sling and vaginal surgeries, urge urinary incontinence, and vaginal atrophy presenting for follow-up.     The patient is following up with pelvic floor physical therapy and is overall very satisfied with this. She did need to miss recent appointment due to bronchitis complaints. However she wishes to continue this moving forward.     She was previously recommended to start amitriptyline which she has not done. She was also counseled regarding her headache complaints to start this medication as well. She desires a new written prescription for this.     [facility] did not provide her urodynamic reports after multiple attempts. She does state that she will provide this at her earliest convenience.     She has no new complaints.     From previous note  72-year-old presenting as a referral from Dr. Park with complicated surgical pelvic floor history and urinary incontinence complaints.     The patient underwent a hysterectomy many years ago. In 1997 she believes she underwent an anterior colporrhaphy. In 2005 and 6 she believes she had a sling placed and again in 2014 another sling was placed. In 2019 she believes she may have had a third sling placed. In 2021 Dr. Maliha Benitez excised or released the sling. She denies any vaginal bulge symptoms and does not believe that any her prior surgeries was performed for this except that in 1997. She states that her surgeries performed in 2005, 2014, and 2019 were to help with her urinary incontinence complaints. Prior to her 2021 surgery, she had noted pain with urination and she believes that the sling was obstructive. Since that time she denies any pain.     She does note episodic leaking with activity and during intercourse. She otherwise denies leaking with coughing or sneezing. She does feel significant urgency roughly every 2 hours. She notes 2-3 episodes of nocturia and denies enuresis.  She has previously utilized oxybutynin and most recently Myrbetriq without improvement in her symptoms. She denies a history of nephrolithiasis, chronic urinary tract infections or any gross hematuria. She has previously been recommended to undergo pelvic floor physical therapy but has not followed up with this.     She otherwise has not been able to be sexually active in the last 2 to 3 years due to significant pain vaginally. She otherwise denies any abnormal vaginal bleeding or discharge. She does note vaginal dryness.     Patient denies any constipation complaints. She denies any fecal incontinence or flatal incontinence.     She has no other complaints.      Review of Systems  Constitutional: No fever, No chills and No fatigue.   Eyes: No vision problems and No dryness of the eyes.   ENT: No dry mouth, No hearing loss and No nosebleeds.   Cardiovascular: No chest pain, No palpitations and No orthopnea.   Respiratory: No shortness of breath, No cough and No wheezing.   Gastrointestinal: No abdominal pain, No constipation, No nausea, No diarrhea, No vomiting and No melena.   Genitourinary: As noted in HPI.   Musculoskeletal: No back pain, No myalgias, No muscle weakness, No joint swelling and No leg edema.   Integumentary: No rashes, No skin lesion and No itching.   Neurological: No headache, No numbness and No dizziness.   Psychiatric: No sleep disturbances, No anxiety and No depression.   Endocrine: No hot flashes, No loss of hair and No hirsutism.   Hematologic/Lymphatic: No swollen glands, No tendency for easy bleeding and No tendency for easy bruising.   All other systems have been reviewed and are negative for complaint.        Objective   Physical Exam      PHYSICAL EXAMINATION:  No LMP recorded.  Body mass index is 32.56 kg/m².  /86   Pulse 86   Wt 91.5 kg (201 lb 11.2 oz)   BMI 32.56 kg/m²   General Appearance: well appearing  Neuro: Alert and oriented   HEENT: mucous membranes moist, neck  supple  Resp: No respiratory distress, normal work of breathing  MSK: normal range of motion, gait appropriate    After the patient was identified and consent was electronically signed, the patient was prepped with lidocaine and iodine.  A flexible cystoscope was then introduced into the bladder noting normal-appearing bladder mucosa and ureteral orifices in the normal orthotopic position.  Using an Olympus needle to a depth of 4 mm, 5 locations were injected across the posterior bladder wall using 2 cc aliquots of a mixture of 100 units Botox mixed into 10 cc of preservative-free normal saline.  There was no bleeding noted.  The bladder was then completely drained.  The patient tolerated the procedure well.  She received 100 mg nitrofurantoin at the completion of the case.    Assessment/Plan      73-year-old with history of exquisite pelvic floor pain, multiple mid urethral sling and vaginal surgeries, urge urinary incontinence, and vaginal atrophy having undergone 100 units of Botox 11/10/2023 and today 03/21/2024.       #1 uncomplicated 100 units Botox today 3/21/2024.  She has previously had excellent results following her 100 units Botox 11/10/2023.  We again discussed the importance of timed voiding every 3-4 hours during the daytime.  We discussed appropriate positioning on the toilet and utilizing a squatty potty.  We have previously discussed her urodynamics. It was notable for hypersensitivity with normal compliance and emptying without having evidence of obstruction from her prior mid urethral slings. We have been unable to obtain her records from [facility] unfortunately. She has not had any benefits to her lower urinary tract symptoms with anticholinergic and beta 3 agonist therapies in the past. However she has been noted to have exquisitely tight and tender pelvic floor muscles. She was previously having excellent results with her pelvic floor physical therapy. Unfortunately she has not noted any significant  benefits with her recent pelvic floor physical therapy. We again discussed the possibility of Valium therapy in the future and she is not interested in this at this time. She is not interested in utilizing Gemtesa as this is cost prohibitive to her.  She is provided a 3-day course of Macrobid therapy.    #2 we again discussed our lower urinary tract symptoms may be related to her exquisite pelvic floor pain. The patient has previously utilized amitriptyline with minimal benefit. We will readdress this at follow-up appointments.     3. We discussed the patient's vaginal atrophy. We discussed the safety, efficacy, proper utilization of vaginal estrogen therapy and she we will continue this 3 times a week moving forward.     #4 the patient will follow-up in 5 to 6 months for repeat 100 units Botox at the [facility].     JALEN Costa MD     Scribe Attestation  By signing my name below, IAyesha Scribe attest that this documentation has been prepared under the direction and in the presence of Henry Costa MD. All medical record entries made by the Scribe were at my direction or personally dictated by me. I have reviewed the chart and agree that the record accurately reflects my personal performance of the history, physical exam, discussion and plan.

## 2024-04-02 DIAGNOSIS — Z76.0 MEDICATION REFILL: ICD-10-CM

## 2024-04-02 RX ORDER — VERAPAMIL HYDROCHLORIDE 120 MG/1
120 TABLET, FILM COATED, EXTENDED RELEASE ORAL DAILY
Qty: 90 TABLET | Refills: 0 | Status: SHIPPED | OUTPATIENT
Start: 2024-04-02 | End: 2024-05-13 | Stop reason: ALTCHOICE

## 2024-04-05 RX ORDER — TIZANIDINE 4 MG/1
4 TABLET ORAL 3 TIMES DAILY PRN
Qty: 30 TABLET | Refills: 1 | Status: CANCELLED | OUTPATIENT
Start: 2024-04-05 | End: 2024-07-04

## 2024-04-05 NOTE — TELEPHONE ENCOUNTER
This medication (Tizanidine) was not RF because there is a potential interaction between it and Verapamil.

## 2024-04-05 NOTE — TELEPHONE ENCOUNTER
PT requesting refill on tizanidine. PT does not see pain management until end of April.  Please send to [facility] in [place]

## 2024-04-22 ENCOUNTER — APPOINTMENT (OUTPATIENT)
Dept: PRIMARY CARE | Facility: CLINIC | Age: 74
End: 2024-04-22
Payer: COMMERCIAL

## 2024-04-24 ENCOUNTER — APPOINTMENT (OUTPATIENT)
Dept: PRIMARY CARE | Facility: CLINIC | Age: 74
End: 2024-04-24
Payer: COMMERCIAL

## 2024-05-02 ENCOUNTER — TELEPHONE (OUTPATIENT)
Dept: SURGERY | Facility: CLINIC | Age: 74
End: 2024-05-02
Payer: COMMERCIAL

## 2024-05-06 ENCOUNTER — APPOINTMENT (OUTPATIENT)
Dept: PRIMARY CARE | Facility: CLINIC | Age: 74
End: 2024-05-06
Payer: COMMERCIAL

## 2024-05-09 ENCOUNTER — TELEPHONE (OUTPATIENT)
Dept: SURGERY | Facility: CLINIC | Age: 74
End: 2024-05-09
Payer: COMMERCIAL

## 2024-05-13 ENCOUNTER — OFFICE VISIT (OUTPATIENT)
Dept: PRIMARY CARE | Facility: CLINIC | Age: 74
End: 2024-05-13
Payer: COMMERCIAL

## 2024-05-13 VITALS
SYSTOLIC BLOOD PRESSURE: 126 MMHG | TEMPERATURE: 97 F | HEART RATE: 85 BPM | BODY MASS INDEX: 33.41 KG/M2 | OXYGEN SATURATION: 99 % | DIASTOLIC BLOOD PRESSURE: 68 MMHG | WEIGHT: 207 LBS

## 2024-05-13 DIAGNOSIS — N95.9 MENOPAUSAL AND POSTMENOPAUSAL DISORDER: ICD-10-CM

## 2024-05-13 DIAGNOSIS — Z12.11 ENCOUNTER FOR SCREENING FOR MALIGNANT NEOPLASM OF COLON: ICD-10-CM

## 2024-05-13 DIAGNOSIS — Z11.59 ENCOUNTER FOR HEPATITIS C SCREENING TEST FOR LOW RISK PATIENT: ICD-10-CM

## 2024-05-13 DIAGNOSIS — G89.29 CHRONIC RIGHT-SIDED LOW BACK PAIN WITH RIGHT-SIDED SCIATICA: ICD-10-CM

## 2024-05-13 DIAGNOSIS — E55.9 VITAMIN D DEFICIENCY: ICD-10-CM

## 2024-05-13 DIAGNOSIS — Z00.00 PHYSICAL EXAM: Primary | ICD-10-CM

## 2024-05-13 DIAGNOSIS — I10 ESSENTIAL HYPERTENSION: ICD-10-CM

## 2024-05-13 DIAGNOSIS — M54.41 CHRONIC RIGHT-SIDED LOW BACK PAIN WITH RIGHT-SIDED SCIATICA: ICD-10-CM

## 2024-05-13 PROCEDURE — 1157F ADVNC CARE PLAN IN RCRD: CPT | Performed by: INTERNAL MEDICINE

## 2024-05-13 PROCEDURE — 1123F ACP DISCUSS/DSCN MKR DOCD: CPT | Performed by: INTERNAL MEDICINE

## 2024-05-13 PROCEDURE — 99397 PER PM REEVAL EST PAT 65+ YR: CPT | Performed by: INTERNAL MEDICINE

## 2024-05-13 PROCEDURE — 1036F TOBACCO NON-USER: CPT | Performed by: INTERNAL MEDICINE

## 2024-05-13 PROCEDURE — 1126F AMNT PAIN NOTED NONE PRSNT: CPT | Performed by: INTERNAL MEDICINE

## 2024-05-13 PROCEDURE — 99215 OFFICE O/P EST HI 40 MIN: CPT | Performed by: INTERNAL MEDICINE

## 2024-05-13 PROCEDURE — 3078F DIAST BP <80 MM HG: CPT | Performed by: INTERNAL MEDICINE

## 2024-05-13 PROCEDURE — 1160F RVW MEDS BY RX/DR IN RCRD: CPT | Performed by: INTERNAL MEDICINE

## 2024-05-13 PROCEDURE — G0439 PPPS, SUBSEQ VISIT: HCPCS | Performed by: INTERNAL MEDICINE

## 2024-05-13 PROCEDURE — 1159F MED LIST DOCD IN RCRD: CPT | Performed by: INTERNAL MEDICINE

## 2024-05-13 PROCEDURE — 3074F SYST BP LT 130 MM HG: CPT | Performed by: INTERNAL MEDICINE

## 2024-05-13 PROCEDURE — 1158F ADVNC CARE PLAN TLK DOCD: CPT | Performed by: INTERNAL MEDICINE

## 2024-05-13 RX ORDER — TIZANIDINE 4 MG/1
4 TABLET ORAL NIGHTLY PRN
Qty: 30 TABLET | Refills: 1 | Status: SHIPPED | OUTPATIENT
Start: 2024-05-13

## 2024-05-13 ASSESSMENT — PATIENT HEALTH QUESTIONNAIRE - PHQ9
SUM OF ALL RESPONSES TO PHQ9 QUESTIONS 1 AND 2: 0
2. FEELING DOWN, DEPRESSED OR HOPELESS: NOT AT ALL
1. LITTLE INTEREST OR PLEASURE IN DOING THINGS: NOT AT ALL

## 2024-05-13 ASSESSMENT — PAIN SCALES - GENERAL: PAINLEVEL: 0-NO PAIN

## 2024-05-13 NOTE — PROGRESS NOTES
Outpatient Visit Note    Chief Complaint   Patient presents with    Annual Exam       HPI:  Oxana Rao is a 74 y.o. female here  for  a Medicare Wellness Visit.  Pt is requesting Tizanidine RF for her chronic back pain.     Health Maintenance    Denies smoking or illicit drug use, drinks no alcoholic beverages a week. Patient reports routine vision checks and dental cleanings, and regular exercise.   Screening colonoscopy overdue. Screening pap N/A. Screening mammogram done 2/2024. DEXA is due.     Hearing screen: reports no difficulty with hearing     Does the patient use opioid medications: No    How does the patient rate their health status today: good     Cognitive Screen:  AAAx3  to person, place and time: Yes  3 word recall: banana, chair, sunshine- Immediate recall: Yes    - 5 minutes recall: Yes, 2 words   Impression: No cognitive deficiency observed during screening or encounter today      Reviewed:   Past Medical History/Allergies:  Yes  Family History:  Yes  Social History:  Yes  Current Medications:  Yes  Vital Signs:  Yes  Advanced Directives:  discussed  Immunizations:  reviewed today  Home Safety:                    Up & Go test > 30 seconds?  No                   Home have rugs; lack grab bars in bathroom; lack handrail on stairs; have poor lighting?  No                   Hearing difficulties?  No  Geriatric Assessment                   ADL areas requiring assistance:  Does not need help with Dressing, Eating, Ambulating, Toileting, Grooming, Hygiene.                    IADL areas requiring assistance:  Does not need help with Shopping, Housework, Accounting, Transportation, Driving.   Medications: reviewed  Current supplements:  Reviewed and recorded.   Other providers: Reviewed and recorded            Past Medical History:   Diagnosis Date    Asthma (HHS-HCC)     Diverticulitis     GERD (gastroesophageal reflux disease)     HTN (hypertension)         Current Medications  Current  Outpatient Medications   Medication Instructions    albuterol (ProAir HFA) 90 mcg/actuation inhaler 1 puff(s) inhaled as needed wheeze or shortness of breath    benzonatate (TESSALON) 200 mg, oral, 3 times daily PRN, Do not crush or chew.    CALCIUM ORAL as directed Orally    cetirizine-pseudoephedrine (ZyrTEC-D) 5-120 mg 12 hr tablet 1 tablet, oral, 2 times daily PRN    cholestyramine (Questran) 4 gram packet 1 packet mixed with water or non-carbonated drink Orally Once a day for 30 day(s)    clotrimazole-betamethasone (Lotrisone) cream APPLY SPARINGLY TO THE AFFECTED AREA(S) TWICE DAILY.    cyanocobalamin (VITAMIN B-12) 1,000 mcg, intramuscular, Every 30 days    ergocalciferol (Vitamin D-2) 1.25 MG (48533 UT) capsule     fluticasone (Flonase) 50 mcg/actuation nasal spray 1 spray, Each Nostril, Daily, Shake gently. Before first use, prime pump. After use, clean tip and replace cap.    hydroCHLOROthiazide (HYDRODiuril) 25 mg tablet hydroCHLOROthiazide 25 MG Oral Tablet   Quantity: 30  Refills: 0        Start : 30-Jan-2015   Active    metFORMIN (Glucophage) 500 mg tablet     multivitamin (multivitamin with folic acid) tablet Multiple Vitamins Oral Tablet   Refills: 0        Start : 8-Jan-2016   Active    omeprazole (PriLOSEC) 40 mg DR capsule TAKE 1 CAPSULE TWICE DAILY.    pseudoephedrine (SUDAFED) 30 mg, oral, Every 4 hours PRN    verapamil SR (CALAN-SR) 120 mg, oral, Daily        Allergies  Allergies   Allergen Reactions    Lactose Other     GAS    Nsaids (Non-Steroidal Anti-Inflammatory Drug) Other     Gastric Bypass    Shrimp Swelling     Could not swallow the morning after she ate shrimp    Latex, Natural Rubber Itching and Other     itching of skin/ skin irritation    Ciprofloxacin GI Upset and Other     Patient reports previous tendon soreness in achilles while on cipro    Codeine Headache, GI Upset and Unknown    Tramadol Headache        Immunizations  Immunization History   Administered Date(s) Administered     Flu vaccine (IIV4), preservative free *Check age/dose* 10/23/2017    Flu vaccine, quadrivalent, high-dose, preservative free, age 65y+ (FLUZONE) 10/06/2020    Flu vaccine, quadrivalent, recombinant, preservative free, adult (FLUBLOK) 12/31/2022    Influenza, High Dose Seasonal, Preservative Free 10/01/2017    Influenza, Seasonal, Quadrivalent, Adjuvanted 10/14/2021    Influenza, seasonal, injectable 12/09/2014    Moderna COVID-19 vaccine, Fall 2023, 12 yeasrs and older (50mcg/0.5mL) 01/23/2024    Moderna SARS-CoV-2 Vaccination 03/05/2021, 04/09/2021, 12/16/2021    Pneumococcal polysaccharide vaccine, 23-valent, age 2 years and older (PNEUMOVAX 23) 12/12/2017        Past Surgical History:   Procedure Laterality Date    APPENDECTOMY  01/08/2016    Appendectomy    APPENDECTOMY      BUNIONECTOMY      HERNIA REPAIR  01/08/2016    Hernia Repair    HIATAL HERNIA REPAIR      HYSTERECTOMY  01/08/2016    Hysterectomy    HYSTERECTOMY      INCONTINENCE SURGERY      KNEE      total knee replacement    OTHER SURGICAL HISTORY  01/08/2016    Bunion Correction With Metatarsal Osteotomy    OTHER SURGICAL HISTORY  01/08/2016    Wrist Arthroplasty    OTHER SURGICAL HISTORY  05/18/2022    Urethropexy    SHOULDER SURGERY  01/08/2016    Shoulder Surgery    TOTAL KNEE ARTHROPLASTY  01/08/2016    Knee Replacement     Family History   Problem Relation Name Age of Onset    Heart attack Mother      Heart disease Mother      Hypertension Mother      Stroke Mother      Asthma Mother      Hypertension Sister      Heart attack Brother      Other (lung/prostate/breast) Brother      Heart disease Brother      Hypertension Brother      Stroke Brother      Asthma Brother      Cancer Brother      Cancer Brother      Asthma Daughter       Social History     Tobacco Use    Smoking status: Never    Smokeless tobacco: Never   Substance Use Topics    Alcohol use: Yes    Drug use: Never     Tobacco Use: Low Risk  (5/13/2024)    Patient History     Smoking  Tobacco Use: Never     Smokeless Tobacco Use: Never     Passive Exposure: Not on file        ROS  All pertinent positive symptoms are included in the history of present illness.  All other systems have been reviewed and are negative and noncontributory to this patient's current ailments.    VITAL SIGNS  Vitals:    05/13/24 1128   BP: 126/68   Pulse: 85   Temp: 36.1 °C (97 °F)   SpO2: 99%     Vitals:    05/13/24 1128   Weight: 93.9 kg (207 lb)      Body mass index is 33.41 kg/m².     PHYSICAL EXAM  GENERAL APPEARANCE: well nourished, well developed, looks like stated age, in no acute distress, not ill or tired appearing, conversing well.   HEENT: no trauma, normocephalic. PERRLA and EOMI with normal external exam. TM's intact with no injection or effusion, no signs of infection. Nares patent, turbinates pink without discharge. Pharynx pink with no exudates or lesions, no enlarged tonsils.   NECK: no nodes, supple without rigidity, no neck mass was observed, no thyromegaly or thyroid nodules.   HEART: regular rate and rhythm, S1 and S2 heard with no murmurs or skipped beats, no carotid bruits.   LUNGS: clear to auscultation bilaterally with no wheezes, crackles or rales.   ABDOMEN: no organomegaly, soft, nontender, nondistended, normal bowel sounds, no guarding/rebound/rigidity.   EXTREMITIES: moving all extremities equally with no edema or deformities.   SKIN: normal skin color and pigmentation, normal skin turgor without rash, lesions, or nodules visualized.   NEUROLOGIC EXAM: CN II-XII grossly intact, normal gait, normal balance, 5/5 muscle strength, sensation grossly intact.   PSYCH: mood and affect appropriate; alert and oriented to time, place, person; no difficulty with speech or language.   LYMPH NODES: no cervical lymphadenopathy.           Assessment/Plan   Diagnoses and all orders for this visit:  Physical exam  Essential hypertension  -     Lipid Panel; Future  -     Comprehensive Metabolic Panel;  Future  -     CBC; Future  -     TSH with reflex to Free T4 if abnormal; Future  -     Urinalysis with Reflex Culture and Microscopic; Future  Chronic right-sided low back pain with right-sided sciatica  -     tiZANidine (Zanaflex) 4 mg tablet; Take 1 tablet (4 mg) by mouth as needed at bedtime for muscle spasms.  Menopausal and postmenopausal disorder  -     XR DEXA bone density; Future  Vitamin D deficiency  -     Vitamin D 25-Hydroxy,Total (for eval of Vitamin D levels); Future  Encounter for screening for malignant neoplasm of colon  -     Colonoscopy Screening; Average Risk Patient; Future  Encounter for hepatitis C screening test for low risk patient  -     Hepatitis C antibody; Future    This was a shared decision making visit.    Next Wellness Exam Due  In 1 year from today      Michael Gill MD   05/13/24   11:34 AM

## 2024-05-28 ENCOUNTER — LAB (OUTPATIENT)
Dept: LAB | Facility: LAB | Age: 74
End: 2024-05-28
Payer: COMMERCIAL

## 2024-05-28 DIAGNOSIS — I10 ESSENTIAL HYPERTENSION: ICD-10-CM

## 2024-05-28 DIAGNOSIS — E55.9 VITAMIN D DEFICIENCY: ICD-10-CM

## 2024-05-28 DIAGNOSIS — Z11.59 ENCOUNTER FOR HEPATITIS C SCREENING TEST FOR LOW RISK PATIENT: ICD-10-CM

## 2024-05-28 LAB
25(OH)D3 SERPL-MCNC: 34 NG/ML (ref 31–100)
ALBUMIN SERPL-MCNC: 4.2 G/DL (ref 3.5–5)
ALP BLD-CCNC: 155 U/L (ref 35–125)
ALT SERPL-CCNC: 46 U/L (ref 5–40)
ANION GAP SERPL CALC-SCNC: 10 MMOL/L
AST SERPL-CCNC: 75 U/L (ref 5–40)
BILIRUB SERPL-MCNC: <0.2 MG/DL (ref 0.1–1.2)
BUN SERPL-MCNC: 16 MG/DL (ref 8–25)
CALCIUM SERPL-MCNC: 9 MG/DL (ref 8.5–10.4)
CHLORIDE SERPL-SCNC: 108 MMOL/L (ref 97–107)
CO2 SERPL-SCNC: 21 MMOL/L (ref 24–31)
CREAT SERPL-MCNC: 1.2 MG/DL (ref 0.4–1.6)
EGFRCR SERPLBLD CKD-EPI 2021: 48 ML/MIN/1.73M*2
ERYTHROCYTE [DISTWIDTH] IN BLOOD BY AUTOMATED COUNT: 15 % (ref 11.5–14.5)
GLUCOSE SERPL-MCNC: 89 MG/DL (ref 65–99)
HCT VFR BLD AUTO: 39.5 % (ref 36–46)
HCV AB SER QL: NONREACTIVE
HGB BLD-MCNC: 12.2 G/DL (ref 12–16)
MCH RBC QN AUTO: 28.6 PG (ref 26–34)
MCHC RBC AUTO-ENTMCNC: 30.9 G/DL (ref 32–36)
MCV RBC AUTO: 93 FL (ref 80–100)
NRBC BLD-RTO: 0 /100 WBCS (ref 0–0)
PLATELET # BLD AUTO: 323 X10*3/UL (ref 150–450)
POTASSIUM SERPL-SCNC: 5.6 MMOL/L (ref 3.4–5.1)
PROT SERPL-MCNC: 6.8 G/DL (ref 5.9–7.9)
RBC # BLD AUTO: 4.27 X10*6/UL (ref 4–5.2)
SODIUM SERPL-SCNC: 139 MMOL/L (ref 133–145)
TSH SERPL DL<=0.05 MIU/L-ACNC: 2.9 MIU/L (ref 0.27–4.2)
WBC # BLD AUTO: 6.1 X10*3/UL (ref 4.4–11.3)

## 2024-05-28 PROCEDURE — 86803 HEPATITIS C AB TEST: CPT

## 2024-05-28 PROCEDURE — 84443 ASSAY THYROID STIM HORMONE: CPT

## 2024-05-28 PROCEDURE — 85027 COMPLETE CBC AUTOMATED: CPT

## 2024-05-28 PROCEDURE — 36415 COLL VENOUS BLD VENIPUNCTURE: CPT

## 2024-05-28 PROCEDURE — 82306 VITAMIN D 25 HYDROXY: CPT

## 2024-05-28 PROCEDURE — 80053 COMPREHEN METABOLIC PANEL: CPT

## 2024-06-09 DIAGNOSIS — E55.9 VITAMIN D DEFICIENCY, UNSPECIFIED: ICD-10-CM

## 2024-06-10 RX ORDER — ERGOCALCIFEROL 1.25 MG/1
CAPSULE ORAL
Qty: 12 CAPSULE | Refills: 6 | Status: SHIPPED | OUTPATIENT
Start: 2024-06-10

## 2024-06-12 DIAGNOSIS — Z76.0 PRESCRIPTION REFILL: ICD-10-CM

## 2024-06-12 DIAGNOSIS — K21.9 GASTRO-ESOPHAGEAL REFLUX DISEASE WITHOUT ESOPHAGITIS: ICD-10-CM

## 2024-06-12 RX ORDER — HYDROCHLOROTHIAZIDE 25 MG/1
25 TABLET ORAL DAILY
Qty: 90 TABLET | Refills: 0 | Status: SHIPPED | OUTPATIENT
Start: 2024-06-12

## 2024-06-12 RX ORDER — OMEPRAZOLE 40 MG/1
CAPSULE, DELAYED RELEASE ORAL
Qty: 90 CAPSULE | Refills: 3 | Status: SHIPPED | OUTPATIENT
Start: 2024-06-12

## 2024-06-12 NOTE — TELEPHONE ENCOUNTER
Pt seen in ED 4/23/23 for asthma, pharyngitis  Given rx for 1 albuterol pump   Please sign (in note to pharmacy put dispense for school) Requesting refill on populated rx - last OV 5/13/2024.

## 2024-06-13 ENCOUNTER — TELEPHONE (OUTPATIENT)
Dept: SURGERY | Facility: CLINIC | Age: 74
End: 2024-06-13
Payer: COMMERCIAL

## 2024-06-17 DIAGNOSIS — K90.9 INTESTINAL MALABSORPTION, UNSPECIFIED TYPE (HHS-HCC): ICD-10-CM

## 2024-06-27 ENCOUNTER — APPOINTMENT (OUTPATIENT)
Dept: RADIOLOGY | Facility: HOSPITAL | Age: 74
End: 2024-06-27
Payer: COMMERCIAL

## 2024-07-05 ENCOUNTER — HOSPITAL ENCOUNTER (OUTPATIENT)
Dept: RADIOLOGY | Facility: HOSPITAL | Age: 74
Discharge: HOME | End: 2024-07-05
Payer: COMMERCIAL

## 2024-07-05 DIAGNOSIS — N95.9 MENOPAUSAL AND POSTMENOPAUSAL DISORDER: ICD-10-CM

## 2024-07-05 PROCEDURE — 77080 DXA BONE DENSITY AXIAL: CPT

## 2024-07-05 ASSESSMENT — LIFESTYLE VARIABLES
CURRENT_SMOKER: N
3_OR_MORE_DRINKS_PER_DAY: N

## 2024-07-10 NOTE — PROGRESS NOTES
Subjective   Patient ID: Oxana Rao is a 74 y.o. female who presents for No chief complaint on file..  HPI  7.3  YR FUV RYGB HHR/ LYSIS OF ADHESIONS  START WT: 233   IDEAL WT:158     START EXCESS: 75     HT: 66.75  IN.  Review of Systems  Bariatric Surgery F/U:          Seen at ER after surgery? No.  Hospital admission? No.  Bariatric reoperation? No.  Bariatric intervention? No.  Was anticoagulation initiated for presumed/confirmed Vein Thrombosis/PE? No.  Was an incisional hernia noted on exam? No.  Sleep Apnea? No.  Still using C-PAP? No.  GERD req. meds? No.  Hyperlipidemia? No.  Still taking Cholesterol med? No.  Hypertension? No.  Still taking HTN med? No.  Number of Anti-hypertensive Medications: 0.  Diabetes? No.  Still taking DM med? No.  Current DM medication type: _____.         CONSTITUTIONAL:          Chills No.  Fatigue No.  Fever No.         CARDIOLOGY:          Negative for dizziness, chest pain, palpitations, shortness of breath.         RESPIRATORY:          Negative for chest congestion, cough, wheezing.         GASTROENTEROLOGY:          Food Intolerance No.  Acid reflux No.  Abdominal pain No.  Black stools No.  Constipation No.  Diarrhea No.  Loss of appetite no.  Nausea No.  Vomiting No.         UROLOGY:          Negative for dysuria, urinary urgency, kidney stones.         PSYCHOLOGY:          Negative for depression, anxiety, high stress level.   Objective   Physical Exam    Assessment/Plan   {Assess/PlanSmartLinks:65300}         Shyanne Luis LPN 07/10/24 1:28 PM

## 2024-07-17 ENCOUNTER — APPOINTMENT (OUTPATIENT)
Dept: SURGERY | Facility: CLINIC | Age: 74
End: 2024-07-17
Payer: COMMERCIAL

## 2024-07-17 ENCOUNTER — LAB (OUTPATIENT)
Dept: LAB | Facility: LAB | Age: 74
End: 2024-07-17
Payer: COMMERCIAL

## 2024-07-17 DIAGNOSIS — K90.9 INTESTINAL MALABSORPTION, UNSPECIFIED TYPE (HHS-HCC): ICD-10-CM

## 2024-07-18 ENCOUNTER — TELEMEDICINE (OUTPATIENT)
Dept: UROLOGY | Facility: CLINIC | Age: 74
End: 2024-07-18
Payer: COMMERCIAL

## 2024-07-18 DIAGNOSIS — N39.41 URGE INCONTINENCE: Primary | ICD-10-CM

## 2024-07-18 DIAGNOSIS — N95.2 ATROPHIC VAGINITIS: ICD-10-CM

## 2024-07-18 DIAGNOSIS — R10.2 PELVIC PAIN: ICD-10-CM

## 2024-07-18 PROCEDURE — 99442 PR PHYS/QHP TELEPHONE EVALUATION 11-20 MIN: CPT | Performed by: OBSTETRICS & GYNECOLOGY

## 2024-07-18 PROCEDURE — 1157F ADVNC CARE PLAN IN RCRD: CPT | Performed by: OBSTETRICS & GYNECOLOGY

## 2024-07-18 RX ORDER — CEFAZOLIN SODIUM 2 G/100ML
2 INJECTION, SOLUTION INTRAVENOUS ONCE
OUTPATIENT
Start: 2024-07-18 | End: 2024-07-18

## 2024-07-18 RX ORDER — SODIUM CHLORIDE, SODIUM LACTATE, POTASSIUM CHLORIDE, CALCIUM CHLORIDE 600; 310; 30; 20 MG/100ML; MG/100ML; MG/100ML; MG/100ML
100 INJECTION, SOLUTION INTRAVENOUS CONTINUOUS
OUTPATIENT
Start: 2024-07-18

## 2024-07-18 NOTE — PROGRESS NOTES
Subjective   Patient ID: Oxana Rao is a 74 y.o. female who presents for follow up.  Today's visit was done virtually after appropriate consent from the patient. Virtual or Telephone Consent     An interactive audio  telecommunication system which permits real time communications between the patient at home and provider (at the distant site) was utilized to provide this telehealth service. Greater than 10 minutes were spent discussing the patients concerns and coordinating care    HPI   This visit was performed through telemedicine   73-year-old with history of exquisite pelvic floor pain, multiple mid urethral sling and vaginal surgeries, urge urinary incontinence, and vaginal atrophy having undergone 100 units of Botox 11/10/2023 and 03/21/2024.      The patient has about 2 months of benefits wit her intradetrusor Botox. She presents to discuss her therapy options. PTNS and InterStim were discussed at length.  She is noting daily incontinence episodes and urgency and frequency roughly every 1-2 hours.  She denies any UTI symptoms.    She denies any vaginal complaints, no abnormal bleeding or discharge.     She denies any bowel related complaints, no fecal or flatal incontinence.    She has no other complaints.    From Previous note  73-year-old with history of exquisite pelvic floor pain, multiple mid urethral sling and vaginal surgeries, urge urinary incontinence, and vaginal atrophy having undergone 100 units of Botox 11/10/2023 and today 03/21/2024.       She has no other complaints.     From Previous note  This visit was performed through telemedicine   73-year-old with history of exquisite pelvic floor pain, multiple mid urethral sling and vaginal surgeries, urge urinary incontinence, and vaginal atrophy having undergone 100 units of Botox 11/10/2023     Since her last visit with us she has had improvement in her urinary symptoms. She does still feel urgency every 3-4 hours. She notes that there are times  where she has not been waking up at night as much but she does watch how much she drinks.  She does feel that it is time for repeat 100 units Botox.  She wishes to have this performed at her earliest convenience.    She has no other complaints.      From Previous note   73-year-old with history of exquisite pelvic floor pain, multiple mid urethral sling and vaginal surgeries, urge urinary incontinence, and vaginal atrophy having undergone 100 units of Botox  11/10/2023     The patient appears to be noting benefits in her lower urinary tract complaints with her recent intradetrusor Botox. PVR today si 150 ml. She does continues to note rare episodes of incontinence. She notes 1-2 episodes of nocturia but denies any enuresis. She is urinating 3 times daily. She denies any UTI like symptoms.     She denies any bowel related complaints, no fecal or flatal incontinence.    She denies any vaginal complaints, no abnormal bleeding or discharge. She denies any pelvic pain.    She has no other complaints.    From Previous note  73-year-old with history of exquisite pelvic floor pain, multiple mid urethral sling and vaginal surgeries, urge urinary incontinence, and vaginal atrophy having undergone 100 units of Botox today 11/10/2023     She has no other complaints.     From Previous note  This visit was performed through telemedicine  73-year-old with history of exquisite pelvic floor pain, multiple mid urethral sling and vaginal surgeries, urge urinary incontinence, and vaginal atrophy presenting to discuss urodynamics.     She has no new complaints.     From previous note  73-year-old with history of exquisite pelvic floor pain, multiple mid urethral sling and vaginal surgeries, urge urinary incontinence, and vaginal atrophy.     The patient notes worsening lower urinary tract complaints. .She notes 3-4 episodes of nocturia and has to rush to the bathroom. She voids 3-5 times during the day with episodes of incontinence in  between. She does also note urinary leakage on movement. She has to wear depends to avoid accidents. She has also followed up with PFPT with no benefits. She denies any UTI likes symptoms today.     She denies any vaginal complaints, no abnormal vaginal bleeding or discharge.      She denies any bowel related complaints, no fecal or flatal incontinence.      She has no other complaints.            From previous note  72-year-old with history of exquisite pelvic floor pain, multiple mid urethral sling and vaginal surgeries, urge urinary incontinence, and vaginal atrophy.     The patient was last seen in August 2022. The patient complaints or worsening urinary urgency, she states she need to void as soon as she sees a bathroom. She utilizes pads to avoid accidents as she leaks. She denies any UTI like symptoms. She is not continuing her pelvic floor physical therapy. She is not utilizing the vaginal estrogen cream.     She denies any vaginal complaints, no abnormal vaginal bleeding or discharge.     She denies any bowel related complaints, no fecal or flatal incontinence.     She has no other complaints.     From previous note  72-year-old with history of exquisite pelvic floor pain, multiple mid urethral sling and vaginal surgeries, urge urinary incontinence, and vaginal atrophy presenting for follow-up.     The patient is following up with pelvic floor physical therapy and is overall very satisfied with this. She did need to miss recent appointment due to bronchitis complaints. However she wishes to continue this moving forward.     She was previously recommended to start amitriptyline which she has not done. She was also counseled regarding her headache complaints to start this medication as well. She desires a new written prescription for this.     [facility] did not provide her urodynamic reports after multiple attempts. She does state that she will provide this at her earliest convenience.     She has no new  complaints.     From previous note  72-year-old presenting as a referral from Dr. Park with complicated surgical pelvic floor history and urinary incontinence complaints.     The patient underwent a hysterectomy many years ago. In 1997 she believes she underwent an anterior colporrhaphy. In 2005 and 6 she believes she had a sling placed and again in 2014 another sling was placed. In 2019 she believes she may have had a third sling placed. In 2021 Dr. Maliha Benitez excised or released the sling. She denies any vaginal bulge symptoms and does not believe that any her prior surgeries was performed for this except that in 1997. She states that her surgeries performed in 2005, 2014, and 2019 were to help with her urinary incontinence complaints. Prior to her 2021 surgery, she had noted pain with urination and she believes that the sling was obstructive. Since that time she denies any pain.     She does note episodic leaking with activity and during intercourse. She otherwise denies leaking with coughing or sneezing. She does feel significant urgency roughly every 2 hours. She notes 2-3 episodes of nocturia and denies enuresis. She has previously utilized oxybutynin and most recently Myrbetriq without improvement in her symptoms. She denies a history of nephrolithiasis, chronic urinary tract infections or any gross hematuria. She has previously been recommended to undergo pelvic floor physical therapy but has not followed up with this.     She otherwise has not been able to be sexually active in the last 2 to 3 years due to significant pain vaginally. She otherwise denies any abnormal vaginal bleeding or discharge. She does note vaginal dryness.     Patient denies any constipation complaints. She denies any fecal incontinence or flatal incontinence.     She has no other complaints.      Review of Systems  Constitutional: No fever, No chills and No fatigue.   Eyes: No vision problems and No dryness of the eyes.   ENT: No  dry mouth, No hearing loss and No nosebleeds.   Cardiovascular: No chest pain, No palpitations and No orthopnea.   Respiratory: No shortness of breath, No cough and No wheezing.   Gastrointestinal: No abdominal pain, No constipation, No nausea, No diarrhea, No vomiting and No melena.   Genitourinary: As noted in HPI.   Musculoskeletal: No back pain, No myalgias, No muscle weakness, No joint swelling and No leg edema.   Integumentary: No rashes, No skin lesion and No itching.   Neurological: No headache, No numbness and No dizziness.   Psychiatric: No sleep disturbances, No anxiety and No depression.   Endocrine: No hot flashes, No loss of hair and No hirsutism.   Hematologic/Lymphatic: No swollen glands, No tendency for easy bleeding and No tendency for easy bruising.   All other systems have been reviewed and are negative for complaint.        Objective   Physical Exam  This visit was performed through telemedicine     Assessment/Plan      73-year-old with history of exquisite pelvic floor pain, multiple mid urethral sling and vaginal surgeries, urge urinary incontinence, and vaginal atrophy having undergone 100 units of Botox 11/10/2023 and 03/21/2024.       #1 unfortunate the patient is only noting roughly 2 months of benefit following her 100 units Botox 3/21/2024.  She has previously had excellent results following her 100 units Botox 11/10/2023.  We again discussed the importance of timed voiding every 3-4 hours during the daytime.  We discussed appropriate positioning on the toilet and utilizing a squatty potty.  We have previously discussed her urodynamics. It was notable for hypersensitivity with normal compliance and emptying without having evidence of obstruction from her prior mid urethral slings. We have been unable to obtain her records from [facility] unfortunately. She has not had any benefits to her lower urinary tract symptoms with anticholinergic and beta 3 agonist therapies in the past. However she has  been noted to have exquisitely tight and tender pelvic floor muscles. She was previously having excellent results with her pelvic floor physical therapy. Unfortunately she has not noted any significant benefits with her recent pelvic floor physical therapy. We again discussed the possibility of Valium therapy in the future and she is not interested in this at this time. She is not interested in utilizing Gemtesa as this is cost prohibitive to her.  We therefore discussed at length today her alternate third line options including PTNS, Revi, and InterStim.  The patient wishes to proceed with the InterStim trial.  She will be scheduled at her earliest convenience.    #2 we again discussed our lower urinary tract symptoms may be related to her exquisite pelvic floor pain. The patient has previously utilized amitriptyline with minimal benefit. We will readdress this at follow-up appointments.     3. We discussed the patient's vaginal atrophy. We discussed the safety, efficacy, proper utilization of vaginal estrogen therapy and she we will continue this 3 times a week moving forward.     #4 the patient will proceed with a stage I InterStim trial at the [facility].     JALEN Costa MD     Scribe Attestation  By signing my name below, I, Jennifer Arceo attest that this documentation has been prepared under the direction and in the presence of Henry Costa MD. All medical record entries made by the Scribe were at my direction or personally dictated by me. I have reviewed the chart and agree that the record accurately reflects my personal performance of the history, physical exam, discussion and plan.

## 2024-07-18 NOTE — PROGRESS NOTES
Subjective   Patient ID: Oxana Rao is a 74 y.o. female who presents for No chief complaint on file..  HPI  7.3 YR FUV SLEEVE/ LYSIS OF ADHESIONS  START WT: 233   IDEAL WT:158     START EXCESS: 75     HT: 66.75  IN.  Review of Systems  Bariatric Surgery F/U:          Seen at ER after surgery? No.  Hospital admission? No.  Bariatric reoperation? No.  Bariatric intervention? No.  Was anticoagulation initiated for presumed/confirmed Vein Thrombosis/PE? No.  Was an incisional hernia noted on exam? No.  Sleep Apnea? No.  Still using C-PAP? No.  GERD req. meds? No.  Hyperlipidemia? No.  Still taking Cholesterol med? No.  Hypertension? No.  Still taking HTN med? No.  Number of Anti-hypertensive Medications: 0.  Diabetes? No.  Still taking DM med? No.  Current DM medication type: _____.         CONSTITUTIONAL:          Chills No.  Fatigue No.  Fever No.         CARDIOLOGY:          Negative for dizziness, chest pain, palpitations, shortness of breath.         RESPIRATORY:          Negative for chest congestion, cough, wheezing.         GASTROENTEROLOGY:          Food Intolerance No.  Acid reflux No.  Abdominal pain No.  Black stools No.  Constipation No.  Diarrhea No.  Loss of appetite no.  Nausea No.  Vomiting No.         UROLOGY:          Negative for dysuria, urinary urgency, kidney stones.         PSYCHOLOGY:          Negative for depression, anxiety, high stress level.   Objective   Physical Exam    Assessment/Plan   {Assess/PlanSmartLinks:92464}         Shyanne Luis LPN 07/18/24 4:23 PM

## 2024-07-19 ENCOUNTER — APPOINTMENT (OUTPATIENT)
Dept: LAB | Facility: LAB | Age: 74
End: 2024-07-19
Payer: COMMERCIAL

## 2024-07-19 LAB
FERRITIN SERPL-MCNC: 41 NG/ML (ref 8–150)
FOLATE SERPL-MCNC: 20.7 NG/ML
IRON SATN MFR SERPL: 20 % (ref 25–45)
IRON SERPL-MCNC: 101 UG/DL (ref 35–150)
PTH-INTACT SERPL-MCNC: 111.9 PG/ML (ref 18.5–88)
TIBC SERPL-MCNC: 497 UG/DL (ref 240–445)
UIBC SERPL-MCNC: 396 UG/DL (ref 110–370)
VIT B12 SERPL-MCNC: 292 PG/ML (ref 211–911)

## 2024-07-19 PROCEDURE — 82607 VITAMIN B-12: CPT

## 2024-07-19 PROCEDURE — 84630 ASSAY OF ZINC: CPT

## 2024-07-19 PROCEDURE — 82728 ASSAY OF FERRITIN: CPT

## 2024-07-19 PROCEDURE — 83550 IRON BINDING TEST: CPT

## 2024-07-19 PROCEDURE — 36415 COLL VENOUS BLD VENIPUNCTURE: CPT

## 2024-07-19 PROCEDURE — 82746 ASSAY OF FOLIC ACID SERUM: CPT

## 2024-07-19 PROCEDURE — 84425 ASSAY OF VITAMIN B-1: CPT

## 2024-07-19 PROCEDURE — 83970 ASSAY OF PARATHORMONE: CPT

## 2024-07-19 PROCEDURE — 83540 ASSAY OF IRON: CPT

## 2024-07-19 PROCEDURE — 82525 ASSAY OF COPPER: CPT

## 2024-07-22 LAB
COPPER SERPL-MCNC: 135.1 UG/DL (ref 80–155)
ZINC SERPL-MCNC: 68 UG/DL (ref 60–120)

## 2024-07-23 ENCOUNTER — APPOINTMENT (OUTPATIENT)
Dept: SURGERY | Facility: CLINIC | Age: 74
End: 2024-07-23
Payer: COMMERCIAL

## 2024-07-24 ENCOUNTER — TELEPHONE (OUTPATIENT)
Dept: PRIMARY CARE | Facility: CLINIC | Age: 74
End: 2024-07-24
Payer: COMMERCIAL

## 2024-07-24 LAB — VIT B1 PYROPHOSHATE BLD-SCNC: 169 NMOL/L (ref 70–180)

## 2024-07-24 NOTE — TELEPHONE ENCOUNTER
PT calling requesting that RX Tizanidine prescribed by Dr. Gill be discontinued by our office. PT states that her other doctor is prescribing this medication for 90 days instead of 30 days, and the current RX by Dr. Gill is overriding the new RX. Please advise.

## 2024-07-24 NOTE — TELEPHONE ENCOUNTER
Are you okay if we call the pharmacy and cancel this under your name? We are getting automated refill requests from the pharmacy.

## 2024-07-29 NOTE — PROGRESS NOTES
Subjective   Patient ID: Oxana Rao is a 74 y.o. female who presents for No chief complaint on file..  HPI  7.3 YR FUV RYGB/ HHR/ LYSIS OF ADHESIONS  START WT: 233 IDEAL WT:158  START EXCESS: 75 HT: 66.75  IN.  Review of Systems  Bariatric Surgery F/U:          Seen at ER after surgery? No.  Hospital admission? No.  Bariatric reoperation? No.  Bariatric intervention? No.  Was anticoagulation initiated for presumed/confirmed Vein Thrombosis/PE? No.  Was an incisional hernia noted on exam? No.  Sleep Apnea? INSOMNIA  No.  Still using C-PAP? No.  GERD req. meds? yes.  Hyperlipidemia? No.  Still taking Cholesterol med? No.  Hypertension? No.  Still taking HTN med? No.  Number of Anti-hypertensive Medications: 1 but she takes it for edema  Diabetes? No.  Still taking DM med? No.  Current DM medication type: _____.         CONSTITUTIONAL:          Chills No.  Fatigue No.  Fever No.    ADMITS TO JOINT PAIN ADMITS TO HEADACHE     CARDIOLOGY:          Negative for dizziness, chest pain, palpitations, shortness of breath.         RESPIRATORY:          Negative for chest congestion, cough, wheezing.         GASTROENTEROLOGY:          Food Intolerance No.  Acid reflux No.  Abdominal pain No.  Black stools No.  Constipation No.  Diarrhea yes  Loss of appetite no.  Nausea No.  Vomiting No.         UROLOGY:          Negative for dysuria, urinary urgency, kidney stones.         PSYCHOLOGY:          Negative for depression, anxiety, high stress level.   Objective   Physical Exam    Assessment/Plan            Shyanne Luis LPN 07/29/24 10:41 AM

## 2024-07-31 ENCOUNTER — APPOINTMENT (OUTPATIENT)
Dept: SURGERY | Facility: CLINIC | Age: 74
End: 2024-07-31
Payer: COMMERCIAL

## 2024-07-31 VITALS
WEIGHT: 201 LBS | HEART RATE: 112 BPM | DIASTOLIC BLOOD PRESSURE: 80 MMHG | SYSTOLIC BLOOD PRESSURE: 128 MMHG | BODY MASS INDEX: 31.55 KG/M2 | HEIGHT: 67 IN

## 2024-07-31 DIAGNOSIS — E21.3 HYPERPARATHYROIDISM (MULTI): ICD-10-CM

## 2024-07-31 DIAGNOSIS — E61.1 IRON DEFICIENCY: ICD-10-CM

## 2024-07-31 DIAGNOSIS — E55.9 VITAMIN D DEFICIENCY: ICD-10-CM

## 2024-07-31 DIAGNOSIS — Z98.84 H/O BARIATRIC SURGERY: ICD-10-CM

## 2024-07-31 DIAGNOSIS — E53.8 VITAMIN B12 DEFICIENCY: ICD-10-CM

## 2024-07-31 DIAGNOSIS — K90.9 INTESTINAL MALABSORPTION, UNSPECIFIED TYPE (HHS-HCC): Primary | ICD-10-CM

## 2024-07-31 PROCEDURE — 99214 OFFICE O/P EST MOD 30 MIN: CPT

## 2024-07-31 PROCEDURE — 1159F MED LIST DOCD IN RCRD: CPT

## 2024-07-31 PROCEDURE — 3079F DIAST BP 80-89 MM HG: CPT

## 2024-07-31 PROCEDURE — 1126F AMNT PAIN NOTED NONE PRSNT: CPT

## 2024-07-31 PROCEDURE — 3074F SYST BP LT 130 MM HG: CPT

## 2024-07-31 PROCEDURE — 1157F ADVNC CARE PLAN IN RCRD: CPT

## 2024-07-31 PROCEDURE — 1036F TOBACCO NON-USER: CPT

## 2024-07-31 PROCEDURE — 3008F BODY MASS INDEX DOCD: CPT

## 2024-07-31 RX ORDER — ERGOCALCIFEROL 1.25 MG/1
1.25 CAPSULE ORAL
Qty: 12 CAPSULE | Refills: 3 | Status: SHIPPED | OUTPATIENT
Start: 2024-08-11 | End: 2025-08-11

## 2024-07-31 ASSESSMENT — COLUMBIA-SUICIDE SEVERITY RATING SCALE - C-SSRS
6. HAVE YOU EVER DONE ANYTHING, STARTED TO DO ANYTHING, OR PREPARED TO DO ANYTHING TO END YOUR LIFE?: NO
1. IN THE PAST MONTH, HAVE YOU WISHED YOU WERE DEAD OR WISHED YOU COULD GO TO SLEEP AND NOT WAKE UP?: NO
2. HAVE YOU ACTUALLY HAD ANY THOUGHTS OF KILLING YOURSELF?: NO

## 2024-07-31 ASSESSMENT — PAIN SCALES - GENERAL: PAINLEVEL: 0-NO PAIN

## 2024-07-31 ASSESSMENT — LIFESTYLE VARIABLES
AUDIT-C TOTAL SCORE: 1
SKIP TO QUESTIONS 9-10: 1
HOW MANY STANDARD DRINKS CONTAINING ALCOHOL DO YOU HAVE ON A TYPICAL DAY: 1 OR 2
HOW OFTEN DO YOU HAVE A DRINK CONTAINING ALCOHOL: MONTHLY OR LESS
HOW OFTEN DO YOU HAVE SIX OR MORE DRINKS ON ONE OCCASION: NEVER

## 2024-07-31 NOTE — PROGRESS NOTES
Subjective   Patient ID: Oxana Rao is a 74 y.o. female who presents for Follow-up (7.3 yr fuv rygb/ hhr/ lysis of adhesions).  ALTA Daigle is here for her 7 year follow up after a RYGB. She has gained 11 pounds since her last visit. As a result, she has maintained a weight loss of 43% of her excess weight. She is having reflux about 2-3 times a month. She tells me that she usually has reflux when she is eating things she is not supposed to. She premedicates with omeprazole when she knows she is going to eat thigs that causes reflux. She does not feel hunger. She tells me it takes only 4 tablespoons to feel full. She tells me that she is only eating 1 meal a day. She does not snack. She tells me that she is very active throughout her day. She is taking a womans 50 plus MVI, she will occasionally get a B12 injection, vitamin D once weekly, calcium once daily. She told me that she separates her calcium from her MVI but she thinks by 30min. She had labwork for this appointment, which we reviewed. She tells me that she is having leg cramps.     Objective   Physical Exam  Constitutional:       Appearance: Normal appearance.   Eyes:      Pupils: Pupils are equal, round, and reactive to light.   Cardiovascular:      Rate and Rhythm: Normal rate.   Pulmonary:      Effort: Pulmonary effort is normal.   Abdominal:      Palpations: Abdomen is soft.   Musculoskeletal:         General: Normal range of motion.   Skin:     General: Skin is warm and dry.   Neurological:      General: No focal deficit present.      Mental Status: She is alert and oriented to person, place, and time.   Psychiatric:         Mood and Affect: Mood normal.        Latest Reference Range & Units 07/19/24 10:18   FERRITIN 8 - 150 ng/mL 41   FOLATE >5.0 ng/mL 20.7   IRON 35 - 150 ug/dL 101   TIBC 240 - 445 ug/dL 497 (H)   UIBC 110 - 370 ug/dL 396 (H)   % Saturation 25 - 45 % 20 (L)   Vitamin B12 211 - 911 pg/mL 292   Copper 80.0 - 155.0 ug/dL 135.1    Zinc, Serum or Plasma 60.0 - 120.0 ug/dL 68.0   Vitamin B1, Whole Blood 70 - 180 nmol/L 169   Parathyroid Hormone, Intact 18.5 - 88.0 pg/mL 111.9 (H)       Assessment/Plan   Problem List Items Addressed This Visit             ICD-10-CM    Hyperparathyroidism (Multi) E21.3    Relevant Orders    Parathyroid Hormone, Intact    Intestinal malabsorption (HHS-HCC) - Primary K90.9    Relevant Orders    Vitamin B12    Parathyroid Hormone, Intact    CBC and Auto Differential    Ferritin    Iron and TIBC    Vitamin D deficiency E55.9    Relevant Medications    ergocalciferol (DrisdoL) 1.25 MG (76355 UT) capsule (Start on 8/11/2024)    Vitamin B12 deficiency E53.8    Relevant Orders    Vitamin B12    H/O bariatric surgery Z98.84    Iron deficiency E61.1    Relevant Orders    CBC and Auto Differential    Ferritin    Iron and TIBC     Oxana and I reviewed the proper supplementation after bariatric surgery. I encouraged her to be more regular with her B12 injection due to her low results. I also reminded her that she needs to separate her calcium citrate and her MVI by 2 hours and to increase it to BID. She will also add an iron supplement to her MVI. We will recheck her labwork in 6 months. I reminded her that a RYGB is not compatible with ASA or NSAID use. Oxana asked my thoughts about taking medication to help with weight loss. We discussed that I did not think it appropriate for her. We discussed that the goal of medication is to reduce portion size and decrease appetite. She reports a very small meal volume and no appetite. We discussed that I would be concerned that medication would cause her to not eat for the entire day or longer, which would make it very difficult for her to reach her protein and nutrient goals. This would make her at greater risk for malnutrition and nutrient deficiencies. We discussed that it is safe for patients with bariatric surgery to take weight loss medication/GLPs, but that I would not  recommend in her case specifically.       Lilia Manning, APRN-CNP 07/31/24 9:40 AM

## 2024-08-08 DIAGNOSIS — Z76.0 PRESCRIPTION REFILL: ICD-10-CM

## 2024-08-09 RX ORDER — HYDROCHLOROTHIAZIDE 25 MG/1
25 TABLET ORAL DAILY
Qty: 90 TABLET | Refills: 2 | Status: SHIPPED | OUTPATIENT
Start: 2024-08-09

## 2024-08-13 ENCOUNTER — APPOINTMENT (OUTPATIENT)
Dept: PRIMARY CARE | Facility: CLINIC | Age: 74
End: 2024-08-13
Payer: COMMERCIAL

## 2024-08-15 ENCOUNTER — PRE-ADMISSION TESTING (OUTPATIENT)
Dept: PREADMISSION TESTING | Facility: HOSPITAL | Age: 74
End: 2024-08-15
Payer: COMMERCIAL

## 2024-08-15 NOTE — CPM/PAT H&P
Patient cancelled pat appt  Patient is an alert and oriented 74 year old female scheduled for a  Stage 1 Interstim w/ Fluoroscopy on 8/28/2024 with Dr. Costa for  Urge Incontinence.      PMHX includes Intestinal Malabsorption, Hyperthyroid, Iron Deficiency, Asthma, SHAJI, HTN, GERD, Gastric Bypass - Rosa en Y.      Presents to [facility] today for perioperative risk stratification and optimization.      Review of Systems   Constitutional: Negative for chills, decreased appetite, diaphoresis, fever and malaise/fatigue.   Eyes:  Negative for blurred vision and double vision.   Cardiovascular:  Negative for chest pain, claudication, cyanosis, dyspnea on exertion, irregular heartbeat, leg swelling, near-syncope and palpitations.   Respiratory:  Negative for cough, hemoptysis, shortness of breath and wheezing.    Endocrine: Negative for cold intolerance, heat intolerance, polydipsia, polyphagia and polyuria.   Gastrointestinal:  Negative for abdominal pain, constipation, diarrhea, dysphagia, nausea and vomiting.   Genitourinary:  Negative for bladder incontinence, dysuria, hematuria, incomplete emptying, nocturia, pelvic pain and urgency.   Neurological:  Negative for headaches, light-headedness, paresthesias, sensory change and weakness.   Psychiatric/Behavioral:  Negative for altered mental status.       Vitals and nursing note reviewed.     Physical exam  Constitutional:       Appearance: Normal appearance. He is normal weight.   HENT:      Head: Normocephalic and atraumatic.      Mouth/Throat:      Mouth: Mucous membranes are moist.      Pharynx: Oropharynx is clear.   Eyes:      Extraocular Movements: Extraocular movements intact.      Conjunctiva/sclera: Conjunctivae normal.      Pupils: Pupils are equal, round, and reactive to light.   Cardiovascular:      Rate and Rhythm: Normal rate and regular rhythm.      Pulses: Normal pulses.      Heart sounds: Normal heart sounds.      No audible carotid bruit  Pulmonary:       Effort: Pulmonary effort is normal.      Breath sounds: Normal breath sounds.   Abdominal:      General: Abdomen is flat. Bowel sounds are normal.      Palpations: Abdomen is soft.   Musculoskeletal:      Cervical back: Normal range of motion and neck supple.   Skin:     General: Skin is warm and dry.      Capillary Refill: Capillary refill takes less than 2 seconds.   Neurological:      General: No focal deficit present.      Mental Status: He is alert and oriented to person, place, and time. Mental status is at baseline.   Psychiatric:         Mood and Affect: Mood normal.         Behavior: Behavior normal.         Thought Content: Thought content normal.         Judgment: Judgment normal.     Vitals and nursing note reviewed. Physical exam within normal limits.     Assessment & Plan:    Neuro:  No diagnosis or significant findings on chart review or clinical presentation and evaluation.     HEENT/Airway:  No diagnosis or significant findings on chart review or clinical presentation and evaluation.   STOP-BANG Score    Mallampati::  II    TM distance::  >3 FB    Neck ROM::  Full  No dentures, caps, or dental implants    Cardiovascular:  Hypertension  Managed with hydrochlorothiazide 25mg daily    METS: 7.01  RCRI: 0 points, 3.9%  risk for postoperative MACE   CARMITA: 0.1% risk for postoperative MACE  EKG -     Pulmonary:  No diagnosis or significant findings on chart review or clinical presentation and evaluation.   ARISCAT: <26 points, 1.6% risk of in-hospital postoperative pulmonary complication  PRODIGY: Moderate risk for opioid induced respiratory depression  Smoking History-  Discussed smoking cessation and deep breathing handout given    Renal:    Urge Incontinence  Stage 1 Interstim w/ Flurosocopy   The patient is at increased risk of perioperative renal complications secondary to age>/= 56 and HTN. Preventative measures include  CMP ordered     Endocrine:  Hyperparathryroid  Last TSH 5/28/2024  2.90    Hematologic:  CBC ordered   Caprini Score 3, patient at Moderate risk for postoperative DVT. Pt supplied education/VTE handout    Gastrointestinal:   GERD  Managed with omeprazole 40mg Daily  Alcohol use-  Drug use-    Infectious disease:   No diagnosis or significant findings on chart review or clinical presentation and evaluation.     Musculoskeletal:   No diagnosis or significant findings on chart review or clinical presentation and evaluation.   JHFRAT score    Anesthesia:    Family history of anesthesia complications    Labs & Imaging ordered:  CBC, CMP,   Nickel/metal allergy-  Shellfish allergy-    Overall, patient at low risk for the scheduled surgery. Discussed with patient medication instructions, NPO guidelines, and any questions or concerns. Patient needs no further workup prior to preceding with elective surgery.     Face to face time-30 minutes

## 2024-08-21 ENCOUNTER — PRE-ADMISSION TESTING (OUTPATIENT)
Dept: PREADMISSION TESTING | Facility: HOSPITAL | Age: 74
End: 2024-08-21
Payer: COMMERCIAL

## 2024-08-21 VITALS
SYSTOLIC BLOOD PRESSURE: 129 MMHG | OXYGEN SATURATION: 96 % | DIASTOLIC BLOOD PRESSURE: 86 MMHG | RESPIRATION RATE: 18 BRPM | BODY MASS INDEX: 32.13 KG/M2 | HEART RATE: 90 BPM | HEIGHT: 67 IN | TEMPERATURE: 97 F | WEIGHT: 204.7 LBS

## 2024-08-21 DIAGNOSIS — Z01.818 PRE-OP EXAMINATION: Primary | ICD-10-CM

## 2024-08-21 DIAGNOSIS — N39.41 URGE INCONTINENCE: ICD-10-CM

## 2024-08-21 PROCEDURE — 99203 OFFICE O/P NEW LOW 30 MIN: CPT

## 2024-08-21 PROCEDURE — 87081 CULTURE SCREEN ONLY: CPT | Mod: WESLAB

## 2024-08-21 RX ORDER — CHLORHEXIDINE GLUCONATE ORAL RINSE 1.2 MG/ML
SOLUTION DENTAL
Qty: 473 ML | Refills: 0 | Status: SHIPPED | OUTPATIENT
Start: 2024-08-21 | End: 2024-08-28

## 2024-08-21 ASSESSMENT — DUKE ACTIVITY SCORE INDEX (DASI)
CAN YOU HAVE SEXUAL RELATIONS: YES
CAN YOU TAKE CARE OF YOURSELF (EAT, DRESS, BATHE, OR USE TOILET): YES
CAN YOU PARTICIPATE IN MODERATE RECREATIONAL ACTIVITIES LIKE GOLF, BOWLING, DANCING, DOUBLES TENNIS OR THROWING A BASEBALL OR FOOTBALL: YES
DASI METS SCORE: 9.9
CAN YOU DO MODERATE WORK AROUND THE HOUSE LIKE VACUUMING, SWEEPING FLOORS OR CARRYING GROCERIES: YES
TOTAL_SCORE: 58.2
CAN YOU WALK A BLOCK OR TWO ON LEVEL GROUND: YES
CAN YOU RUN A SHORT DISTANCE: YES
CAN YOU CLIMB A FLIGHT OF STAIRS OR WALK UP A HILL: YES
CAN YOU DO HEAVY WORK AROUND THE HOUSE LIKE SCRUBBING FLOORS OR LIFTING AND MOVING HEAVY FURNITURE: YES
CAN YOU PARTICIPATE IN STRENOUS SPORTS LIKE SWIMMING, SINGLES TENNIS, FOOTBALL, BASKETBALL, OR SKIING: YES
CAN YOU DO YARD WORK LIKE RAKING LEAVES, WEEDING OR PUSHING A MOWER: YES
CAN YOU DO LIGHT WORK AROUND THE HOUSE LIKE DUSTING OR WASHING DISHES: YES
CAN YOU WALK INDOORS, SUCH AS AROUND YOUR HOUSE: YES

## 2024-08-21 ASSESSMENT — ENCOUNTER SYMPTOMS
NEUROLOGICAL NEGATIVE: 1
ENDOCRINE NEGATIVE: 1
MUSCULOSKELETAL NEGATIVE: 1
RESPIRATORY NEGATIVE: 1
CONSTITUTIONAL NEGATIVE: 1
PSYCHIATRIC NEGATIVE: 1
ALLERGIC/IMMUNOLOGIC NEGATIVE: 1
GASTROINTESTINAL NEGATIVE: 1
HEMATOLOGIC/LYMPHATIC NEGATIVE: 1
EYES NEGATIVE: 1

## 2024-08-21 ASSESSMENT — PAIN - FUNCTIONAL ASSESSMENT: PAIN_FUNCTIONAL_ASSESSMENT: 0-10

## 2024-08-21 ASSESSMENT — PAIN SCALES - GENERAL: PAINLEVEL_OUTOF10: 0 - NO PAIN

## 2024-08-21 NOTE — H&P (VIEW-ONLY)
CPM/PAT Evaluation       Name: Oxana Rao (Oxana Rao)  /Age: 1950/74 y.o.     In-Person       Chief Complaint: Urinary incontinence    HPI: Oxana Rao is a pleasant 74 year old female who has been dealing with urinary incontinence for the past 5 years. She states she has had multiple bladder slings and vaginal surgeries. She recently did Botox injections that are not helping with the incontinence. She states when she feels she has the urge to urinate , and stands up , she has no control over the urine that comes out. She states she has soaked through so many sanitary napkins that now she has to wear a depends. She states she is changing her depends 3 to 4 times a day. She is scheduled to have a Stage I InterStim with fluoroscopy followed by Stage II InterStim a few weeks later. She denies fever, chills, nausea, vomiting, chest pain ,sob, or palpitations.     Past Medical History:   Diagnosis Date    Asthma (HHS-HCC)     Diverticulitis     GERD (gastroesophageal reflux disease)     HTN (hypertension)     Intestinal malabsorption (HHS-HCC)        Past Surgical History:   Procedure Laterality Date    APPENDECTOMY  2016    Appendectomy    APPENDECTOMY      BUNIONECTOMY      HERNIA REPAIR  2016    Hernia Repair    HIATAL HERNIA REPAIR      HYSTERECTOMY  2016    Hysterectomy    HYSTERECTOMY      INCONTINENCE SURGERY      KNEE      total knee replacement    OTHER SURGICAL HISTORY  2016    Bunion Correction With Metatarsal Osteotomy    OTHER SURGICAL HISTORY  2016    Wrist Arthroplasty    OTHER SURGICAL HISTORY  2022    Urethropexy    SHOULDER SURGERY  2016    Shoulder Surgery    TOTAL KNEE ARTHROPLASTY  2016    Knee Replacement       Social History     Tobacco Use    Smoking status: Never    Smokeless tobacco: Never   Substance Use Topics    Alcohol use: Yes     Comment: SOCIALLY     Social History     Substance and Sexual Activity   Drug Use Never          Family History   Problem Relation Name Age of Onset    Heart attack Mother      Heart disease Mother      Hypertension Mother      Stroke Mother      Asthma Mother      Hypertension Sister      Heart attack Brother      Other (lung/prostate/breast) Brother      Heart disease Brother      Hypertension Brother      Stroke Brother      Asthma Brother      Cancer Brother      Cancer Brother      Asthma Daughter         Allergies   Allergen Reactions    Lactose Other     GAS    Nsaids (Non-Steroidal Anti-Inflammatory Drug) Other     Gastric Bypass    Shrimp Swelling     Could not swallow the morning after she ate shrimp    Latex, Natural Rubber Itching and Other     itching of skin/ skin irritation    Ciprofloxacin GI Upset and Other     Patient reports previous tendon soreness in achilles while on cipro    Codeine Headache, GI Upset and Unknown    Tramadol Headache       Current Outpatient Medications   Medication Sig Dispense Refill    CALCIUM ORAL 2 times a day.      cholestyramine (Questran) 4 gram packet 1 packet mixed with water or non-carbonated drink Orally Once a day for 30 day(s)      clotrimazole-betamethasone (Lotrisone) cream APPLY SPARINGLY TO THE AFFECTED AREA(S) TWICE DAILY.      cyanocobalamin (Vitamin B-12) 1,000 mcg/mL injection Inject 1 mL (1,000 mcg) into the muscle every 30 (thirty) days.      ergocalciferol (Vitamin D-2) 1.25 MG (76894 UT) capsule TAKE 1 CAPSULE BY MOUTH ONE TIME PER WEEK FOR 90 DAYS (Patient taking differently: TAKES ON SUNDAY) 12 capsule 6    fluticasone (Flonase) 50 mcg/actuation nasal spray Administer 1 spray into each nostril once daily. Shake gently. Before first use, prime pump. After use, clean tip and replace cap. 16 g 1    hydroCHLOROthiazide (HYDRODiuril) 25 mg tablet TAKE 1 TABLET BY MOUTH EVERY DAY 90 tablet 2    multivitamin (multivitamin with folic acid) tablet Multiple Vitamins Oral Tablet   Refills: 0        Start : 8-Jan-2016   Active      omeprazole  (PriLOSEC) 40 mg DR capsule TAKE 1 CAPSULE BY MOUTH 30 MINUTES BEFORE MORNING MEAL EVERY DAY FOR 90 DAYS 90 capsule 3    albuterol (ProAir HFA) 90 mcg/actuation inhaler 1 puff(s) inhaled as needed wheeze or shortness of breath      chlorhexidine (Peridex) 0.12 % solution Use as directed 473 mL 0    ergocalciferol (DrisdoL) 1.25 MG (86885 UT) capsule Take 1 capsule (1,250 mcg) by mouth 1 (one) time per week. Do not fill before August 11, 2024. 12 capsule 3    metFORMIN (Glucophage) 500 mg tablet        No current facility-administered medications for this visit.       Review of Systems   Constitutional: Negative.    HENT: Negative.     Eyes: Negative.    Respiratory: Negative.     Cardiovascular:  Positive for leg swelling.   Gastrointestinal: Negative.    Endocrine: Negative.    Genitourinary:         Incontinence   Musculoskeletal: Negative.    Skin: Negative.    Allergic/Immunologic: Negative.    Neurological: Negative.    Hematological: Negative.    Psychiatric/Behavioral: Negative.                Physical Exam  Vitals reviewed.   Constitutional:       Appearance: Normal appearance.   HENT:      Head: Normocephalic and atraumatic.      Nose: Nose normal.      Mouth/Throat:      Mouth: Mucous membranes are moist.      Pharynx: Oropharynx is clear.   Eyes:      Extraocular Movements: Extraocular movements intact.      Conjunctiva/sclera: Conjunctivae normal.   Cardiovascular:      Rate and Rhythm: Normal rate and regular rhythm.      Pulses: Normal pulses.      Heart sounds: Normal heart sounds.   Pulmonary:      Effort: Pulmonary effort is normal.      Breath sounds: Normal breath sounds.   Abdominal:      General: Bowel sounds are normal.      Palpations: Abdomen is soft.   Genitourinary:     Comments: Assessment referred to physician  Musculoskeletal:         General: Swelling (bilateral ankles and feet) present.      Cervical back: Normal range of motion.   Skin:     General: Skin is warm and dry.  "  Neurological:      General: No focal deficit present.      Mental Status: She is alert and oriented to person, place, and time.   Psychiatric:         Mood and Affect: Mood normal.         Behavior: Behavior normal.         Thought Content: Thought content normal.         Judgment: Judgment normal.          PAT AIRWAY:   Airway:     Mallampati::  III    TM distance::  >3 FB    Neck ROM::  Full  normal      /86   Pulse 90   Temp 36.1 °C (97 °F) (Temporal)   Resp 18   Ht 1.695 m (5' 6.73\")   Wt 92.9 kg (204 lb 11.2 oz)   SpO2 96%   BMI 32.32 kg/m²     ASA: 2  RMOELIA: 2.8  RCRI: 0.4%      DASI Risk Score      Flowsheet Row Most Recent Value   DASI SCORE 58.2   METS Score (Will be calculated only when all the questions are answered) 9.9          Caprini DVT Assessment    No data to display       Modified Frailty Index    No data to display       CHADS2 Stroke Risk  Current as of 34 minutes ago        N/A 3 to 100%: High Risk   2 to < 3%: Medium Risk   0 to < 2%: Low Risk     Last Change: N/A          This score determines the patient's risk of having a stroke if the patient has atrial fibrillation.        This score is not applicable to this patient. Components are not calculated.          Revised Cardiac Risk Index      Flowsheet Row Most Recent Value   Revised Cardiac Risk Calculator 0          Apfel Simplified Score    No data to display       Risk Analysis Index Results This Encounter    No data found in the last 1 encounters.       Stop Bang Score      Flowsheet Row Most Recent Value   Do you snore loudly? 0   Do you often feel tired or fatigued after your sleep? 0   Has anyone ever observed you stop breathing in your sleep? 0   Do you have or are you being treated for high blood pressure? 0   Recent BMI (Calculated) 32.3   Is BMI greater than 35 kg/m2? 0=No   Age older than 50 years old? 1=Yes   Is your neck circumference greater than 17 inches (Male) or 16 inches (Female)? 0   Gender - Male 0=No "   STOP-BANG Total Score 1            Assessment and Plan:     Urge incontinence : Stage I InterStim with fluoroscopy, Stage II InterStim   Hypertension  Bilateral ankle and feet edema: patient takes hydrochlorothiazide. Educated the patient on wearing compression socks during the day and taking them off at night. Decrease sodium intake.   Intestinal malabsorption: hx of gastric bypass, Questran  GERD  BMI: 32.32    LABS 5/28/24  MRSA collected in [facility]

## 2024-08-21 NOTE — CPM/PAT H&P
CPM/PAT Evaluation       Name: Oxana Rao (Oxana Rao)  /Age: 1950/74 y.o.     In-Person       Chief Complaint: Urinary incontinence    HPI: Oxana Rao is a pleasant 74 year old female who has been dealing with urinary incontinence for the past 5 years. She states she has had multiple bladder slings and vaginal surgeries. She recently did Botox injections that are not helping with the incontinence. She states when she feels she has the urge to urinate , and stands up , she has no control over the urine that comes out. She states she has soaked through so many sanitary napkins that now she has to wear a depends. She states she is changing her depends 3 to 4 times a day. She is scheduled to have a Stage I InterStim with fluoroscopy followed by Stage II InterStim a few weeks later. She denies fever, chills, nausea, vomiting, chest pain ,sob, or palpitations.     Past Medical History:   Diagnosis Date    Asthma ([facility]-HCC)     Diverticulitis     GERD (gastroesophageal reflux disease)     HTN (hypertension)     Intestinal malabsorption ([facility]HS-HCC)        Past Surgical History:   Procedure Laterality Date    APPENDECTOMY  2016    Appendectomy    APPENDECTOMY      BUNIONECTOMY      HERNIA REPAIR  2016    Hernia Repair    HIATAL HERNIA REPAIR      HYSTERECTOMY  2016    Hysterectomy    HYSTERECTOMY      INCONTINENCE SURGERY      KNEE      total knee replacement    OTHER SURGICAL HISTORY  2016    Bunion Correction With Metatarsal Osteotomy    OTHER SURGICAL HISTORY  2016    Wrist Arthroplasty    OTHER SURGICAL HISTORY  2022    Urethropexy    SHOULDER SURGERY  2016    Shoulder Surgery    TOTAL KNEE ARTHROPLASTY  2016    Knee Replacement       Social History     Tobacco Use    Smoking status: Never    Smokeless tobacco: Never   Substance Use Topics    Alcohol use: Yes     Comment: SOCIALLY     Social History     Substance and Sexual Activity   Drug Use Never          Family History   Problem Relation Name Age of Onset    Heart attack Mother      Heart disease Mother      Hypertension Mother      Stroke Mother      Asthma Mother      Hypertension Sister      Heart attack Brother      Other (lung/prostate/breast) Brother      Heart disease Brother      Hypertension Brother      Stroke Brother      Asthma Brother      Cancer Brother      Cancer Brother      Asthma Daughter         Allergies   Allergen Reactions    Lactose Other     GAS    Nsaids (Non-Steroidal Anti-Inflammatory Drug) Other     Gastric Bypass    Shrimp Swelling     Could not swallow the morning after she ate shrimp    Latex, Natural Rubber Itching and Other     itching of skin/ skin irritation    Ciprofloxacin GI Upset and Other     Patient reports previous tendon soreness in achilles while on cipro    Codeine Headache, GI Upset and Unknown    Tramadol Headache       Current Outpatient Medications   Medication Sig Dispense Refill    CALCIUM ORAL 2 times a day.      cholestyramine (Questran) 4 gram packet 1 packet mixed with water or non-carbonated drink Orally Once a day for 30 day(s)      clotrimazole-betamethasone (Lotrisone) cream APPLY SPARINGLY TO THE AFFECTED AREA(S) TWICE DAILY.      cyanocobalamin (Vitamin B-12) 1,000 mcg/mL injection Inject 1 mL (1,000 mcg) into the muscle every 30 (thirty) days.      ergocalciferol (Vitamin D-2) 1.25 MG (12023 UT) capsule TAKE 1 CAPSULE BY MOUTH ONE TIME PER WEEK FOR 90 DAYS (Patient taking differently: TAKES ON SUNDAY) 12 capsule 6    fluticasone (Flonase) 50 mcg/actuation nasal spray Administer 1 spray into each nostril once daily. Shake gently. Before first use, prime pump. After use, clean tip and replace cap. 16 g 1    hydroCHLOROthiazide (HYDRODiuril) 25 mg tablet TAKE 1 TABLET BY MOUTH EVERY DAY 90 tablet 2    multivitamin (multivitamin with folic acid) tablet Multiple Vitamins Oral Tablet   Refills: 0        Start : 8-Jan-2016   Active      omeprazole  (PriLOSEC) 40 mg DR capsule TAKE 1 CAPSULE BY MOUTH 30 MINUTES BEFORE MORNING MEAL EVERY DAY FOR 90 DAYS 90 capsule 3    albuterol (ProAir HFA) 90 mcg/actuation inhaler 1 puff(s) inhaled as needed wheeze or shortness of breath      chlorhexidine (Peridex) 0.12 % solution Use as directed 473 mL 0    ergocalciferol (DrisdoL) 1.25 MG (96739 UT) capsule Take 1 capsule (1,250 mcg) by mouth 1 (one) time per week. Do not fill before August 11, 2024. 12 capsule 3    metFORMIN (Glucophage) 500 mg tablet        No current facility-administered medications for this visit.       Review of Systems   Constitutional: Negative.    HENT: Negative.     Eyes: Negative.    Respiratory: Negative.     Cardiovascular:  Positive for leg swelling.   Gastrointestinal: Negative.    Endocrine: Negative.    Genitourinary:         Incontinence   Musculoskeletal: Negative.    Skin: Negative.    Allergic/Immunologic: Negative.    Neurological: Negative.    Hematological: Negative.    Psychiatric/Behavioral: Negative.                Physical Exam  Vitals reviewed.   Constitutional:       Appearance: Normal appearance.   HENT:      Head: Normocephalic and atraumatic.      Nose: Nose normal.      Mouth/Throat:      Mouth: Mucous membranes are moist.      Pharynx: Oropharynx is clear.   Eyes:      Extraocular Movements: Extraocular movements intact.      Conjunctiva/sclera: Conjunctivae normal.   Cardiovascular:      Rate and Rhythm: Normal rate and regular rhythm.      Pulses: Normal pulses.      Heart sounds: Normal heart sounds.   Pulmonary:      Effort: Pulmonary effort is normal.      Breath sounds: Normal breath sounds.   Abdominal:      General: Bowel sounds are normal.      Palpations: Abdomen is soft.   Genitourinary:     Comments: Assessment referred to physician  Musculoskeletal:         General: Swelling (bilateral ankles and feet) present.      Cervical back: Normal range of motion.   Skin:     General: Skin is warm and dry.  "  Neurological:      General: No focal deficit present.      Mental Status: She is alert and oriented to person, place, and time.   Psychiatric:         Mood and Affect: Mood normal.         Behavior: Behavior normal.         Thought Content: Thought content normal.         Judgment: Judgment normal.          PAT AIRWAY:   Airway:     Mallampati::  III    TM distance::  >3 FB    Neck ROM::  Full  normal      /86   Pulse 90   Temp 36.1 °C (97 °F) (Temporal)   Resp 18   Ht 1.695 m (5' 6.73\")   Wt 92.9 kg (204 lb 11.2 oz)   SpO2 96%   BMI 32.32 kg/m²     ASA: 2  ROMELIA: 2.8  RCRI: 0.4%      DASI Risk Score      Flowsheet Row Most Recent Value   DASI SCORE 58.2   METS Score (Will be calculated only when all the questions are answered) 9.9          Caprini DVT Assessment    No data to display       Modified Frailty Index    No data to display       CHADS2 Stroke Risk  Current as of 34 minutes ago        N/A 3 to 100%: High Risk   2 to < 3%: Medium Risk   0 to < 2%: Low Risk     Last Change: N/A          This score determines the patient's risk of having a stroke if the patient has atrial fibrillation.        This score is not applicable to this patient. Components are not calculated.          Revised Cardiac Risk Index      Flowsheet Row Most Recent Value   Revised Cardiac Risk Calculator 0          Apfel Simplified Score    No data to display       Risk Analysis Index Results This Encounter    No data found in the last 1 encounters.       Stop Bang Score      Flowsheet Row Most Recent Value   Do you snore loudly? 0   Do you often feel tired or fatigued after your sleep? 0   Has anyone ever observed you stop breathing in your sleep? 0   Do you have or are you being treated for high blood pressure? 0   Recent BMI (Calculated) 32.3   Is BMI greater than 35 kg/m2? 0=No   Age older than 50 years old? 1=Yes   Is your neck circumference greater than 17 inches (Male) or 16 inches (Female)? 0   Gender - Male 0=No "   STOP-BANG Total Score 1            Assessment and Plan:     Urge incontinence : Stage I InterStim with fluoroscopy, Stage II InterStim   Hypertension  Bilateral ankle and feet edema: patient takes hydrochlorothiazide. Educated the patient on wearing compression socks during the day and taking them off at night. Decrease sodium intake.   Intestinal malabsorption: hx of gastric bypass, Questran  GERD  BMI: 32.32    LABS 5/28/24  MRSA collected in [facility]

## 2024-08-21 NOTE — H&P (VIEW-ONLY)
CPM/PAT Evaluation       Name: Oxana Rao (Oxana Rao)  /Age: 1950/74 y.o.     In-Person       Chief Complaint: Urinary incontinence    HPI: Oxana Rao is a pleasant 74 year old female who has been dealing with urinary incontinence for the past 5 years. She states she has had multiple bladder slings and vaginal surgeries. She recently did Botox injections that are not helping with the incontinence. She states when she feels she has the urge to urinate , and stands up , she has no control over the urine that comes out. She states she has soaked through so many sanitary napkins that now she has to wear a depends. She states she is changing her depends 3 to 4 times a day. She is scheduled to have a Stage I InterStim with fluoroscopy followed by Stage II InterStim a few weeks later. She denies fever, chills, nausea, vomiting, chest pain ,sob, or palpitations.     Past Medical History:   Diagnosis Date    Asthma ([facility]HS-HCC)     Diverticulitis     GERD (gastroesophageal reflux disease)     HTN (hypertension)     Intestinal malabsorption (HHS-HCC)        Past Surgical History:   Procedure Laterality Date    APPENDECTOMY  2016    Appendectomy    APPENDECTOMY      BUNIONECTOMY      HERNIA REPAIR  2016    Hernia Repair    HIATAL HERNIA REPAIR      HYSTERECTOMY  2016    Hysterectomy    HYSTERECTOMY      INCONTINENCE SURGERY      KNEE      total knee replacement    OTHER SURGICAL HISTORY  2016    Bunion Correction With Metatarsal Osteotomy    OTHER SURGICAL HISTORY  2016    Wrist Arthroplasty    OTHER SURGICAL HISTORY  2022    Urethropexy    SHOULDER SURGERY  2016    Shoulder Surgery    TOTAL KNEE ARTHROPLASTY  2016    Knee Replacement       Social History     Tobacco Use    Smoking status: Never    Smokeless tobacco: Never   Substance Use Topics    Alcohol use: Yes     Comment: SOCIALLY     Social History     Substance and Sexual Activity   Drug Use Never          Family History   Problem Relation Name Age of Onset    Heart attack Mother      Heart disease Mother      Hypertension Mother      Stroke Mother      Asthma Mother      Hypertension Sister      Heart attack Brother      Other (lung/prostate/breast) Brother      Heart disease Brother      Hypertension Brother      Stroke Brother      Asthma Brother      Cancer Brother      Cancer Brother      Asthma Daughter         Allergies   Allergen Reactions    Lactose Other     GAS    Nsaids (Non-Steroidal Anti-Inflammatory Drug) Other     Gastric Bypass    Shrimp Swelling     Could not swallow the morning after she ate shrimp    Latex, Natural Rubber Itching and Other     itching of skin/ skin irritation    Ciprofloxacin GI Upset and Other     Patient reports previous tendon soreness in achilles while on cipro    Codeine Headache, GI Upset and Unknown    Tramadol Headache       Current Outpatient Medications   Medication Sig Dispense Refill    CALCIUM ORAL 2 times a day.      cholestyramine (Questran) 4 gram packet 1 packet mixed with water or non-carbonated drink Orally Once a day for 30 day(s)      clotrimazole-betamethasone (Lotrisone) cream APPLY SPARINGLY TO THE AFFECTED AREA(S) TWICE DAILY.      cyanocobalamin (Vitamin B-12) 1,000 mcg/mL injection Inject 1 mL (1,000 mcg) into the muscle every 30 (thirty) days.      ergocalciferol (Vitamin D-2) 1.25 MG (55214 UT) capsule TAKE 1 CAPSULE BY MOUTH ONE TIME PER WEEK FOR 90 DAYS (Patient taking differently: TAKES ON SUNDAY) 12 capsule 6    fluticasone (Flonase) 50 mcg/actuation nasal spray Administer 1 spray into each nostril once daily. Shake gently. Before first use, prime pump. After use, clean tip and replace cap. 16 g 1    hydroCHLOROthiazide (HYDRODiuril) 25 mg tablet TAKE 1 TABLET BY MOUTH EVERY DAY 90 tablet 2    multivitamin (multivitamin with folic acid) tablet Multiple Vitamins Oral Tablet   Refills: 0        Start : 8-Jan-2016   Active      omeprazole  (PriLOSEC) 40 mg DR capsule TAKE 1 CAPSULE BY MOUTH 30 MINUTES BEFORE MORNING MEAL EVERY DAY FOR 90 DAYS 90 capsule 3    albuterol (ProAir HFA) 90 mcg/actuation inhaler 1 puff(s) inhaled as needed wheeze or shortness of breath      chlorhexidine (Peridex) 0.12 % solution Use as directed 473 mL 0    ergocalciferol (DrisdoL) 1.25 MG (55255 UT) capsule Take 1 capsule (1,250 mcg) by mouth 1 (one) time per week. Do not fill before August 11, 2024. 12 capsule 3    metFORMIN (Glucophage) 500 mg tablet        No current facility-administered medications for this visit.       Review of Systems   Constitutional: Negative.    HENT: Negative.     Eyes: Negative.    Respiratory: Negative.     Cardiovascular:  Positive for leg swelling.   Gastrointestinal: Negative.    Endocrine: Negative.    Genitourinary:         Incontinence   Musculoskeletal: Negative.    Skin: Negative.    Allergic/Immunologic: Negative.    Neurological: Negative.    Hematological: Negative.    Psychiatric/Behavioral: Negative.                Physical Exam  Vitals reviewed.   Constitutional:       Appearance: Normal appearance.   HENT:      Head: Normocephalic and atraumatic.      Nose: Nose normal.      Mouth/Throat:      Mouth: Mucous membranes are moist.      Pharynx: Oropharynx is clear.   Eyes:      Extraocular Movements: Extraocular movements intact.      Conjunctiva/sclera: Conjunctivae normal.   Cardiovascular:      Rate and Rhythm: Normal rate and regular rhythm.      Pulses: Normal pulses.      Heart sounds: Normal heart sounds.   Pulmonary:      Effort: Pulmonary effort is normal.      Breath sounds: Normal breath sounds.   Abdominal:      General: Bowel sounds are normal.      Palpations: Abdomen is soft.   Genitourinary:     Comments: Assessment referred to physician  Musculoskeletal:         General: Swelling (bilateral ankles and feet) present.      Cervical back: Normal range of motion.   Skin:     General: Skin is warm and dry.  "  Neurological:      General: No focal deficit present.      Mental Status: She is alert and oriented to person, place, and time.   Psychiatric:         Mood and Affect: Mood normal.         Behavior: Behavior normal.         Thought Content: Thought content normal.         Judgment: Judgment normal.          PAT AIRWAY:   Airway:     Mallampati::  III    TM distance::  >3 FB    Neck ROM::  Full  normal      /86   Pulse 90   Temp 36.1 °C (97 °F) (Temporal)   Resp 18   Ht 1.695 m (5' 6.73\")   Wt 92.9 kg (204 lb 11.2 oz)   SpO2 96%   BMI 32.32 kg/m²     ASA: 2  ROMELIA: 2.8  RCRI: 0.4%      DASI Risk Score      Flowsheet Row Most Recent Value   DASI SCORE 58.2   METS Score (Will be calculated only when all the questions are answered) 9.9          Caprini DVT Assessment    No data to display       Modified Frailty Index    No data to display       CHADS2 Stroke Risk  Current as of 34 minutes ago        N/A 3 to 100%: High Risk   2 to < 3%: Medium Risk   0 to < 2%: Low Risk     Last Change: N/A          This score determines the patient's risk of having a stroke if the patient has atrial fibrillation.        This score is not applicable to this patient. Components are not calculated.          Revised Cardiac Risk Index      Flowsheet Row Most Recent Value   Revised Cardiac Risk Calculator 0          Apfel Simplified Score    No data to display       Risk Analysis Index Results This Encounter    No data found in the last 1 encounters.       Stop Bang Score      Flowsheet Row Most Recent Value   Do you snore loudly? 0   Do you often feel tired or fatigued after your sleep? 0   Has anyone ever observed you stop breathing in your sleep? 0   Do you have or are you being treated for high blood pressure? 0   Recent BMI (Calculated) 32.3   Is BMI greater than 35 kg/m2? 0=No   Age older than 50 years old? 1=Yes   Is your neck circumference greater than 17 inches (Male) or 16 inches (Female)? 0   Gender - Male 0=No "   STOP-BANG Total Score 1            Assessment and Plan:     Urge incontinence : Stage I InterStim with fluoroscopy, Stage II InterStim   Hypertension  Bilateral ankle and feet edema: patient takes hydrochlorothiazide. Educated the patient on wearing compression socks during the day and taking them off at night. Decrease sodium intake.   Intestinal malabsorption: hx of gastric bypass, Questran  GERD  BMI: 32.32    LABS 5/28/24  MRSA collected in [facility]

## 2024-08-23 LAB — STAPHYLOCOCCUS SPEC CULT: NORMAL

## 2024-08-28 ENCOUNTER — ANESTHESIA (OUTPATIENT)
Dept: OPERATING ROOM | Facility: HOSPITAL | Age: 74
End: 2024-08-28
Payer: COMMERCIAL

## 2024-08-28 ENCOUNTER — ANESTHESIA EVENT (OUTPATIENT)
Dept: OPERATING ROOM | Facility: HOSPITAL | Age: 74
End: 2024-08-28
Payer: COMMERCIAL

## 2024-08-28 ENCOUNTER — HOSPITAL ENCOUNTER (OUTPATIENT)
Facility: HOSPITAL | Age: 74
Setting detail: OUTPATIENT SURGERY
Discharge: HOME | End: 2024-08-28
Attending: OBSTETRICS & GYNECOLOGY | Admitting: OBSTETRICS & GYNECOLOGY
Payer: COMMERCIAL

## 2024-08-28 ENCOUNTER — APPOINTMENT (OUTPATIENT)
Dept: RADIOLOGY | Facility: HOSPITAL | Age: 74
End: 2024-08-28
Payer: COMMERCIAL

## 2024-08-28 VITALS
TEMPERATURE: 97.2 F | SYSTOLIC BLOOD PRESSURE: 155 MMHG | RESPIRATION RATE: 16 BRPM | WEIGHT: 201.72 LBS | BODY MASS INDEX: 31.66 KG/M2 | DIASTOLIC BLOOD PRESSURE: 84 MMHG | HEIGHT: 67 IN | HEART RATE: 61 BPM | OXYGEN SATURATION: 99 %

## 2024-08-28 DIAGNOSIS — N39.41 URGE INCONTINENCE: Primary | ICD-10-CM

## 2024-08-28 PROCEDURE — 2500000004 HC RX 250 GENERAL PHARMACY W/ HCPCS (ALT 636 FOR OP/ED): Mod: JZ | Performed by: OBSTETRICS & GYNECOLOGY

## 2024-08-28 PROCEDURE — 7100000002 HC RECOVERY ROOM TIME - EACH INCREMENTAL 1 MINUTE: Performed by: OBSTETRICS & GYNECOLOGY

## 2024-08-28 PROCEDURE — 7100000001 HC RECOVERY ROOM TIME - INITIAL BASE CHARGE: Performed by: OBSTETRICS & GYNECOLOGY

## 2024-08-28 PROCEDURE — C1778 LEAD, NEUROSTIMULATOR: HCPCS | Performed by: OBSTETRICS & GYNECOLOGY

## 2024-08-28 PROCEDURE — A64561 PR PRQ IMPLTJ NEUROSTIM ELTRD SACRAL NRVE W/IMAGING: Performed by: NURSE ANESTHETIST, CERTIFIED REGISTERED

## 2024-08-28 PROCEDURE — 3700000002 HC GENERAL ANESTHESIA TIME - EACH INCREMENTAL 1 MINUTE: Performed by: OBSTETRICS & GYNECOLOGY

## 2024-08-28 PROCEDURE — 3700000001 HC GENERAL ANESTHESIA TIME - INITIAL BASE CHARGE: Performed by: OBSTETRICS & GYNECOLOGY

## 2024-08-28 PROCEDURE — 2720000007 HC OR 272 NO HCPCS: Performed by: OBSTETRICS & GYNECOLOGY

## 2024-08-28 PROCEDURE — 3600000009 HC OR TIME - EACH INCREMENTAL 1 MINUTE - PROCEDURE LEVEL FOUR: Performed by: OBSTETRICS & GYNECOLOGY

## 2024-08-28 PROCEDURE — 2500000004 HC RX 250 GENERAL PHARMACY W/ HCPCS (ALT 636 FOR OP/ED): Performed by: OBSTETRICS & GYNECOLOGY

## 2024-08-28 PROCEDURE — A64561 PR PRQ IMPLTJ NEUROSTIM ELTRD SACRAL NRVE W/IMAGING: Performed by: STUDENT IN AN ORGANIZED HEALTH CARE EDUCATION/TRAINING PROGRAM

## 2024-08-28 PROCEDURE — 7100000010 HC PHASE TWO TIME - EACH INCREMENTAL 1 MINUTE: Performed by: OBSTETRICS & GYNECOLOGY

## 2024-08-28 PROCEDURE — 64561 IMPLANT NEUROELECTRODES: CPT | Performed by: OBSTETRICS & GYNECOLOGY

## 2024-08-28 PROCEDURE — 99100 ANES PT EXTEME AGE<1 YR&>70: CPT | Performed by: STUDENT IN AN ORGANIZED HEALTH CARE EDUCATION/TRAINING PROGRAM

## 2024-08-28 PROCEDURE — 2500000004 HC RX 250 GENERAL PHARMACY W/ HCPCS (ALT 636 FOR OP/ED): Performed by: STUDENT IN AN ORGANIZED HEALTH CARE EDUCATION/TRAINING PROGRAM

## 2024-08-28 PROCEDURE — 2500000004 HC RX 250 GENERAL PHARMACY W/ HCPCS (ALT 636 FOR OP/ED): Performed by: NURSE ANESTHETIST, CERTIFIED REGISTERED

## 2024-08-28 PROCEDURE — 2500000005 HC RX 250 GENERAL PHARMACY W/O HCPCS: Performed by: OBSTETRICS & GYNECOLOGY

## 2024-08-28 PROCEDURE — 2780000003 HC OR 278 NO HCPCS: Performed by: OBSTETRICS & GYNECOLOGY

## 2024-08-28 PROCEDURE — C1883 ADAPT/EXT, PACING/NEURO LEAD: HCPCS | Performed by: OBSTETRICS & GYNECOLOGY

## 2024-08-28 PROCEDURE — A4649 SURGICAL SUPPLIES: HCPCS | Performed by: OBSTETRICS & GYNECOLOGY

## 2024-08-28 PROCEDURE — 3600000004 HC OR TIME - INITIAL BASE CHARGE - PROCEDURE LEVEL FOUR: Performed by: OBSTETRICS & GYNECOLOGY

## 2024-08-28 PROCEDURE — 7100000009 HC PHASE TWO TIME - INITIAL BASE CHARGE: Performed by: OBSTETRICS & GYNECOLOGY

## 2024-08-28 DEVICE — LEAD KIT, INTERSTIM SURESCAN MRI 4.32MM SPACING, 28CM LENGTH: Type: IMPLANTABLE DEVICE | Site: SACRUM | Status: FUNCTIONAL

## 2024-08-28 RX ORDER — SODIUM CHLORIDE, SODIUM LACTATE, POTASSIUM CHLORIDE, CALCIUM CHLORIDE 600; 310; 30; 20 MG/100ML; MG/100ML; MG/100ML; MG/100ML
40 INJECTION, SOLUTION INTRAVENOUS CONTINUOUS
Status: DISCONTINUED | OUTPATIENT
Start: 2024-08-28 | End: 2024-08-28 | Stop reason: HOSPADM

## 2024-08-28 RX ORDER — ONDANSETRON HYDROCHLORIDE 2 MG/ML
INJECTION, SOLUTION INTRAVENOUS AS NEEDED
Status: DISCONTINUED | OUTPATIENT
Start: 2024-08-28 | End: 2024-08-28

## 2024-08-28 RX ORDER — IPRATROPIUM BROMIDE 0.5 MG/2.5ML
500 SOLUTION RESPIRATORY (INHALATION) EVERY 30 MIN PRN
Status: DISCONTINUED | OUTPATIENT
Start: 2024-08-28 | End: 2024-08-28 | Stop reason: HOSPADM

## 2024-08-28 RX ORDER — ALBUTEROL SULFATE 0.83 MG/ML
2.5 SOLUTION RESPIRATORY (INHALATION) EVERY 30 MIN PRN
Status: DISCONTINUED | OUTPATIENT
Start: 2024-08-28 | End: 2024-08-28 | Stop reason: HOSPADM

## 2024-08-28 RX ORDER — MIDAZOLAM HYDROCHLORIDE 1 MG/ML
INJECTION, SOLUTION INTRAMUSCULAR; INTRAVENOUS AS NEEDED
Status: DISCONTINUED | OUTPATIENT
Start: 2024-08-28 | End: 2024-08-28

## 2024-08-28 RX ORDER — MEPERIDINE HYDROCHLORIDE 25 MG/ML
12.5 INJECTION INTRAMUSCULAR; INTRAVENOUS; SUBCUTANEOUS EVERY 10 MIN PRN
Status: DISCONTINUED | OUTPATIENT
Start: 2024-08-28 | End: 2024-08-28 | Stop reason: HOSPADM

## 2024-08-28 RX ORDER — ACETAMINOPHEN 500 MG
1000 TABLET ORAL EVERY 8 HOURS PRN
Qty: 60 TABLET | Refills: 2 | Status: SHIPPED | OUTPATIENT
Start: 2024-08-28

## 2024-08-28 RX ORDER — FENTANYL CITRATE 50 UG/ML
50 INJECTION, SOLUTION INTRAMUSCULAR; INTRAVENOUS EVERY 5 MIN PRN
Status: DISCONTINUED | OUTPATIENT
Start: 2024-08-28 | End: 2024-08-28 | Stop reason: HOSPADM

## 2024-08-28 RX ORDER — SODIUM CHLORIDE 0.9 G/100ML
IRRIGANT IRRIGATION AS NEEDED
Status: DISCONTINUED | OUTPATIENT
Start: 2024-08-28 | End: 2024-08-28 | Stop reason: HOSPADM

## 2024-08-28 RX ORDER — ONDANSETRON HYDROCHLORIDE 2 MG/ML
4 INJECTION, SOLUTION INTRAVENOUS ONCE AS NEEDED
Status: DISCONTINUED | OUTPATIENT
Start: 2024-08-28 | End: 2024-08-28 | Stop reason: HOSPADM

## 2024-08-28 RX ORDER — FENTANYL CITRATE 50 UG/ML
25 INJECTION, SOLUTION INTRAMUSCULAR; INTRAVENOUS EVERY 5 MIN PRN
Status: DISCONTINUED | OUTPATIENT
Start: 2024-08-28 | End: 2024-08-28 | Stop reason: HOSPADM

## 2024-08-28 RX ORDER — LIDOCAINE HYDROCHLORIDE 10 MG/ML
INJECTION INFILTRATION; PERINEURAL AS NEEDED
Status: DISCONTINUED | OUTPATIENT
Start: 2024-08-28 | End: 2024-08-28 | Stop reason: HOSPADM

## 2024-08-28 RX ORDER — FENTANYL CITRATE 50 UG/ML
INJECTION, SOLUTION INTRAMUSCULAR; INTRAVENOUS AS NEEDED
Status: DISCONTINUED | OUTPATIENT
Start: 2024-08-28 | End: 2024-08-28

## 2024-08-28 RX ORDER — LABETALOL HYDROCHLORIDE 5 MG/ML
5 INJECTION, SOLUTION INTRAVENOUS EVERY 5 MIN PRN
Status: DISCONTINUED | OUTPATIENT
Start: 2024-08-28 | End: 2024-08-28 | Stop reason: HOSPADM

## 2024-08-28 RX ORDER — PROPOFOL 10 MG/ML
INJECTION, EMULSION INTRAVENOUS AS NEEDED
Status: DISCONTINUED | OUTPATIENT
Start: 2024-08-28 | End: 2024-08-28

## 2024-08-28 RX ORDER — LIDOCAINE HYDROCHLORIDE AND EPINEPHRINE 10; 10 MG/ML; UG/ML
INJECTION, SOLUTION INFILTRATION; PERINEURAL AS NEEDED
Status: DISCONTINUED | OUTPATIENT
Start: 2024-08-28 | End: 2024-08-28 | Stop reason: HOSPADM

## 2024-08-28 RX ORDER — CEPHALEXIN 500 MG/1
500 CAPSULE ORAL 2 TIMES DAILY
Qty: 10 CAPSULE | Refills: 0 | Status: SHIPPED | OUTPATIENT
Start: 2024-08-28 | End: 2024-09-02

## 2024-08-28 RX ORDER — CEFAZOLIN SODIUM 2 G/100ML
2 INJECTION, SOLUTION INTRAVENOUS ONCE
Status: COMPLETED | OUTPATIENT
Start: 2024-08-28 | End: 2024-08-28

## 2024-08-28 RX ORDER — SODIUM CHLORIDE, SODIUM LACTATE, POTASSIUM CHLORIDE, CALCIUM CHLORIDE 600; 310; 30; 20 MG/100ML; MG/100ML; MG/100ML; MG/100ML
100 INJECTION, SOLUTION INTRAVENOUS CONTINUOUS
Status: DISCONTINUED | OUTPATIENT
Start: 2024-08-28 | End: 2024-08-28 | Stop reason: HOSPADM

## 2024-08-28 SDOH — HEALTH STABILITY: MENTAL HEALTH: CURRENT SMOKER: 0

## 2024-08-28 ASSESSMENT — PAIN - FUNCTIONAL ASSESSMENT
PAIN_FUNCTIONAL_ASSESSMENT: 0-10

## 2024-08-28 ASSESSMENT — PAIN SCALES - GENERAL
PAINLEVEL_OUTOF10: 4
PAINLEVEL_OUTOF10: 5 - MODERATE PAIN
PAINLEVEL_OUTOF10: 4
PAINLEVEL_OUTOF10: 6
PAINLEVEL_OUTOF10: 7
PAINLEVEL_OUTOF10: 0 - NO PAIN

## 2024-08-28 ASSESSMENT — PAIN DESCRIPTION - ORIENTATION
ORIENTATION: RIGHT
ORIENTATION: RIGHT

## 2024-08-28 ASSESSMENT — PAIN DESCRIPTION - LOCATION
LOCATION: SACRUM
LOCATION: SACRUM

## 2024-08-28 NOTE — ANESTHESIA PREPROCEDURE EVALUATION
Patient: Oxana Rao    Procedure Information       Date/Time: 08/28/24 0815    Procedure: Stage I InterStim with fluoroscopy    Location: [facility] / [facility]    Surgeons: Henry Costa MD          Past Medical History:   Diagnosis Date    Asthma ([facility]HS-HCC)     Diverticulitis     GERD (gastroesophageal reflux disease)     HTN (hypertension)     Intestinal malabsorption ([facility]HS-HCC)      Past Surgical History:   Procedure Laterality Date    APPENDECTOMY  01/08/2016    Appendectomy    APPENDECTOMY      BUNIONECTOMY      HERNIA REPAIR  01/08/2016    Hernia Repair    HIATAL HERNIA REPAIR      HYSTERECTOMY  01/08/2016    Hysterectomy    HYSTERECTOMY      INCONTINENCE SURGERY      KNEE      total knee replacement    OTHER SURGICAL HISTORY  01/08/2016    Bunion Correction With Metatarsal Osteotomy    OTHER SURGICAL HISTORY  01/08/2016    Wrist Arthroplasty    OTHER SURGICAL HISTORY  05/18/2022    Urethropexy    SHOULDER SURGERY  01/08/2016    Shoulder Surgery    TOTAL KNEE ARTHROPLASTY  01/08/2016    Knee Replacement       Relevant Problems   Anesthesia (within normal limits)      Cardiac   (+) Essential hypertension   (+) Hyperlipidemia      Pulmonary   (+) Asthma (HHS-HCC)      GI   (+) Gastroesophageal reflux disease      Endocrine   (+) Hyperparathyroidism (Multi)   (+) Obesity      Musculoskeletal   (+) Fibromyalgia   (+) Osteoarthritis of knee      ID   (+) H. pylori infection      Skin   (+) Eczema       Clinical information reviewed:   Tobacco  Allergies  Meds   Med Hx  Surg Hx  OB Status  Fam Hx  Soc   Hx        NPO Detail:  NPO/Void Status  Carbohydrate Drink Given Prior to Surgery? : N  Date of Last Liquid: 08/27/24  Time of Last Liquid: 2100  Date of Last Solid: 08/27/24  Time of Last Solid: 2100  Last Intake Type: Clear fluids  Time of Last Void: 0600         Physical Exam    Airway  Mallampati: II  TM distance: >3 FB  Neck ROM: full     Cardiovascular    Dental    Pulmonary    Abdominal             Anesthesia Plan    History of general anesthesia?: yes  History of complications of general anesthesia?: no    ASA 2     MAC     The patient is not a current smoker.  Patient was not previously instructed to abstain from smoking on day of procedure.  Patient did not smoke on day of procedure.  Education provided regarding risk of obstructive sleep apnea.  intravenous induction   Postoperative administration of opioids is intended.  Anesthetic plan and risks discussed with patient.  Use of blood products discussed with patient who consented to blood products.    Plan discussed with CRNA and CAA.

## 2024-08-28 NOTE — INTERVAL H&P NOTE
H&P reviewed. The patient was examined and there are no changes to the H&P.  Plan to proceed with stage I InterStim trial.

## 2024-08-28 NOTE — NURSING NOTE
[employer] rep at bedside to discuss home going instructions.  
Patient in Phase 2; dressed and up to chair with RN assist. Tolerating po fluids, minimal complaint of pain and no complaint of nausea.     Family at bedside; discussed discharge instructions with patient and Family. All questions at this time answered.     Patient clinically appropriate for discharge. IV removed and patient transported to discharge area via wheelchair.     
Written and verbal discharge instructions given to patient and patient's daughter.  Questions answered.  Understanding verbalized.  
negative - no dysuria

## 2024-08-28 NOTE — ANESTHESIA POSTPROCEDURE EVALUATION
Patient: Oxana Rao    Procedure Summary       Date: 08/28/24 Room / Location: [facility] / [facility]    Anesthesia Start: 0830 Anesthesia Stop: 0920    Procedure: Stage I InterStim with fluoroscopy Diagnosis:       Urge incontinence      (Urge incontinence [N39.41])    Surgeons: Henry Costa MD Responsible Provider: Jarod Reyes DO    Anesthesia Type: MAC ASA Status: 2            Anesthesia Type: MAC    Vitals Value Taken Time   /93 08/28/24 0925   Temp 36.1 °C (97 °F) 08/28/24 0925   Pulse 69 08/28/24 0925   Resp 14 08/28/24 0925   SpO2 93 % 08/28/24 0925       Anesthesia Post Evaluation    Patient location during evaluation: bedside  Patient participation: complete - patient participated  Level of consciousness: awake and alert  Pain management: adequate  Multimodal analgesia pain management approach  Airway patency: patent  Two or more strategies used to mitigate risk of obstructive sleep apnea  Cardiovascular status: acceptable  Respiratory status: acceptable  Hydration status: acceptable  Postoperative Nausea and Vomiting: none        There were no known notable events for this encounter.

## 2024-08-28 NOTE — PERIOPERATIVE NURSING NOTE
Patient resting comfortably. States pain is tolerable. VS stable. Tolerating PO fluids and crackers with no issues.

## 2024-08-28 NOTE — PERIOPERATIVE NURSING NOTE
Patient alert and oriented. Surgical site clean, dry and intact with ice pack applied. Patient tolerating PO fluids and crackers with no issues of nausea/vomiting.VS stable. Patient was medicated with IV pain meds for moderate to severe pain.

## 2024-08-28 NOTE — OP NOTE
Stage I InterStim with fluoroscopy Operative Note     Date: 2024  OR Location: [facility]    Name: Oxana Rao, : 1950, Age: 74 y.o., MRN: [id number], Sex: female    Diagnosis  Pre-op Diagnosis      * Urge incontinence [N39.41] Post-op Diagnosis     * Urge incontinence [N39.41]     Procedures  Stage I InterStim with fluoroscopy  16147 - DE PRQ IMPLTJ NEUROSTIM ELTRD SACRAL NRVE W/IMAGING    CHG FLUOROSCOPY UP TO 1 HOUR PHYSICIAN/QHP TIME [23434]  Surgeons      * Henry Costa - Primary    Resident/Fellow/Other Assistant:  Surgeons and Role:     * Trey Ocampo MD - Resident - Observing    Procedure Summary  Anesthesia: Monitor Anesthesia Care  ASA: II  Anesthesia Staff: Anesthesiologist: Jarod Reyes DO  CRNA: CORA Villalta-CRNA  Estimated Blood Loss: Less than 5 mL  Intra-op Medications:   Administrations occurring from 0815 to 30 on 24:   Medication Name Total Dose   lidocaine (Xylocaine) 10 mg/mL (1 %) injection 20 mL   sodium chloride 0.9 % irrigation solution 100 mL   lactated Ringer's infusion Cannot be calculated   ceFAZolin (Ancef) 2 g in dextrose (iso)  mL 2 g              Anesthesia Record               Intraprocedure I/O Totals          Intake    lactated Ringer's infusion 250.00 mL    ceFAZolin (Ancef) 2 g in dextrose (iso)  mL 100.00 mL    Total Intake 350 mL          Specimen: No specimens collected     Staff:   Circulator: Tila  Circulator: Maryan  Scrub Person: Venus Lance Scrub: Radha         Drains and/or Catheters: * None in log *    Tourniquet Times:         Implants:  Implants       Type Name Action Serial No.      Implant LEAD KIT, INTERSTIM SURESCAN MRI 4.32MM SPACING, 28CM LENGTH - [id number] Implanted               Findings: Appropriate placement on AP and lateral imaging.  Low voltage response at less than 1.6 mA on leads 0, 1, 2, and 3.    Indications: Oxana Rao is an 74 y.o. female who is having surgery for Urge  incontinence [N39.41].  She has previously failed oral and Botox therapy.  She presents today for [facility] stage I trial.    The patient was seen in the preoperative area. The risks, benefits, complications, treatment options, non-operative alternatives, expected recovery and outcomes were discussed with the patient. The possibilities of reaction to medication, pulmonary aspiration, injury to surrounding structures, bleeding, recurrent infection, the need for additional procedures, failure to diagnose a condition, and creating a complication requiring transfusion or operation were discussed with the patient. The patient concurred with the proposed plan, giving informed consent.  The site of surgery was properly noted/marked if necessary per policy. The patient has been actively warmed in preoperative area. Preoperative antibiotics have been ordered and given within 1 hours of incision. Venous thrombosis prophylaxis are not indicated.    Procedure Details: After consent was signed and placed in the chart, the patient was taken back to the operating room where anesthesia was found be adequate. The patient received preoperative antibiotics per protocol.  The patient was placed into the prone position. The upper buttocks were taped per protocol then prepped and draped in the usual sterile fashion.    In the midline, we marked out a spot 9 centimeters cranial to the tip of the coccyx.  We then marked out 2 centimeters on either side of the midline as our entry points.  These were anesthetized with 1% lidocaine with epinephrine.   Fluoroscopic guidance, finder needles were then placed into the S3 foramen and tested for motor and danica response.  Our final selection was confirmed under fluoroscopy to be in the right S3 foramina. The needle was tested and showed a good danica and toe response, so we elected to use this as our location.   We removed the stylette and a directional guidewire was passed. We removed the  needle and made a 2 mm stab incision at this site. We passed the white dilator over our wire and then verified the correct depth on fluoroscopy. We then removed the inner obturator from the dilator sheath and replaced it with our InterStim quadripolar lead using the curved stylet. We exposed the ends of the leads and verified position on fluoroscopy and tested with low voltage responses at 1.6 Mamp at the 0,1,2,3 lead. After verifying good position fluoroscopically, under live fluoroscopic guidance the tines were deployed. We then created a subcutaneous pouch for our battery via a 3 cm incision in the buttock on the right that was of sufficient size to accommodate the wires. We then tunneled the tined lead over to our pocket.  The external wire extension was then delivered into this incision from a point 5 to 6 cm superior to the pocket using the supplied bladed trocar.  The tined lead was then connected to this external wire and the excess external wire was coiled and tied with a prolene suture.  The deep tissues of the pocket were then closed with several interrupted 2-0 Vicryl sutures and the skin closed with a running 4-0 subcuticular Vicryl suture. All stab incisions and the pocket incision was then closed with Dermabond.    A dressing was applied, and the procedure was concluded. The patient was transferred back to the postoperative recovery area in stable condition.   Complications:  None; patient tolerated the procedure well.    Disposition: PACU - hemodynamically stable.  Condition: stable         Additional Details:     Attending Attestation:     Henry Costa  Phone Number: [phone]

## 2024-08-29 DIAGNOSIS — Z11.1 PPD SCREENING TEST: ICD-10-CM

## 2024-08-29 ASSESSMENT — PAIN SCALES - GENERAL: PAINLEVEL_OUTOF10: 7

## 2024-09-03 ENCOUNTER — APPOINTMENT (OUTPATIENT)
Dept: SURGERY | Facility: CLINIC | Age: 74
End: 2024-09-03
Payer: COMMERCIAL

## 2024-09-03 ENCOUNTER — CLINICAL SUPPORT (OUTPATIENT)
Dept: PRIMARY CARE | Facility: CLINIC | Age: 74
End: 2024-09-03
Payer: COMMERCIAL

## 2024-09-03 DIAGNOSIS — K90.9 INTESTINAL MALABSORPTION, UNSPECIFIED TYPE (HHS-HCC): ICD-10-CM

## 2024-09-03 DIAGNOSIS — E53.8 VITAMIN B12 DEFICIENCY: Primary | ICD-10-CM

## 2024-09-03 DIAGNOSIS — Z11.1 PPD SCREENING TEST: ICD-10-CM

## 2024-09-03 PROCEDURE — 96372 THER/PROPH/DIAG INJ SC/IM: CPT

## 2024-09-03 PROCEDURE — 1157F ADVNC CARE PLAN IN RCRD: CPT

## 2024-09-03 PROCEDURE — 86580 TB INTRADERMAL TEST: CPT | Performed by: INTERNAL MEDICINE

## 2024-09-03 RX ORDER — CYANOCOBALAMIN 1000 UG/ML
1000 INJECTION, SOLUTION INTRAMUSCULAR; SUBCUTANEOUS ONCE
Status: COMPLETED | OUTPATIENT
Start: 2024-09-03 | End: 2024-09-03

## 2024-09-05 ENCOUNTER — TELEPHONE (OUTPATIENT)
Dept: UROLOGY | Facility: CLINIC | Age: 74
End: 2024-09-05

## 2024-09-05 ENCOUNTER — APPOINTMENT (OUTPATIENT)
Dept: PRIMARY CARE | Facility: CLINIC | Age: 74
End: 2024-09-05
Payer: COMMERCIAL

## 2024-09-05 ENCOUNTER — CLINICAL SUPPORT (OUTPATIENT)
Dept: PRIMARY CARE | Facility: CLINIC | Age: 74
End: 2024-09-05
Payer: COMMERCIAL

## 2024-09-05 ENCOUNTER — OFFICE VISIT (OUTPATIENT)
Dept: UROLOGY | Facility: CLINIC | Age: 74
End: 2024-09-05
Payer: COMMERCIAL

## 2024-09-05 ENCOUNTER — APPOINTMENT (OUTPATIENT)
Dept: UROLOGY | Facility: CLINIC | Age: 74
End: 2024-09-05
Payer: COMMERCIAL

## 2024-09-05 VITALS
SYSTOLIC BLOOD PRESSURE: 136 MMHG | TEMPERATURE: 98 F | HEART RATE: 83 BPM | HEIGHT: 66 IN | DIASTOLIC BLOOD PRESSURE: 83 MMHG | WEIGHT: 206.4 LBS | BODY MASS INDEX: 33.17 KG/M2 | OXYGEN SATURATION: 99 % | RESPIRATION RATE: 16 BRPM

## 2024-09-05 DIAGNOSIS — Z23 FLU VACCINE NEED: ICD-10-CM

## 2024-09-05 DIAGNOSIS — Z23 NEEDS FLU SHOT: Primary | ICD-10-CM

## 2024-09-05 DIAGNOSIS — R21 RASH: ICD-10-CM

## 2024-09-05 DIAGNOSIS — Z11.1 PPD SCREENING TEST: ICD-10-CM

## 2024-09-05 DIAGNOSIS — R60.9 NON-PITTING EDEMA: ICD-10-CM

## 2024-09-05 LAB
INDURATION: 0 MM
TB SKIN TEST: NEGATIVE

## 2024-09-05 PROCEDURE — 1126F AMNT PAIN NOTED NONE PRSNT: CPT | Performed by: PHYSICIAN ASSISTANT

## 2024-09-05 PROCEDURE — 90662 IIV NO PRSV INCREASED AG IM: CPT | Performed by: PHYSICIAN ASSISTANT

## 2024-09-05 PROCEDURE — 1157F ADVNC CARE PLAN IN RCRD: CPT | Performed by: PHYSICIAN ASSISTANT

## 2024-09-05 PROCEDURE — 99214 OFFICE O/P EST MOD 30 MIN: CPT | Performed by: PHYSICIAN ASSISTANT

## 2024-09-05 PROCEDURE — 3075F SYST BP GE 130 - 139MM HG: CPT | Performed by: PHYSICIAN ASSISTANT

## 2024-09-05 PROCEDURE — 3079F DIAST BP 80-89 MM HG: CPT | Performed by: PHYSICIAN ASSISTANT

## 2024-09-05 PROCEDURE — 99214 OFFICE O/P EST MOD 30 MIN: CPT | Mod: 24 | Performed by: PHYSICIAN ASSISTANT

## 2024-09-05 PROCEDURE — 3008F BODY MASS INDEX DOCD: CPT | Performed by: PHYSICIAN ASSISTANT

## 2024-09-05 RX ORDER — DIPHENHYDRAMINE HCL 25 MG
25 CAPSULE ORAL EVERY 6 HOURS PRN
Qty: 30 CAPSULE | Refills: 0 | Status: SHIPPED | OUTPATIENT
Start: 2024-09-05 | End: 2024-10-05

## 2024-09-05 ASSESSMENT — PAIN SCALES - GENERAL: PAINLEVEL: 0-NO PAIN

## 2024-09-05 NOTE — PROGRESS NOTES
Subjective   Patient ID: Oxana Rao is a 74 y.o. female who presents for Follow-up.  HPI  Patient is a 73 yo female with OAB s/p Interstim stage 1  presents to [facility] surgical dressing.    Patient reports that she has a full body rash that has been itchy since she had surgery. She repots lower extremity swelling bilaterally as well. She is on hydrochlorothiazide 25 mg which was managing left lower extremity edema but states it is not helping time.    Rash noted on the back and arms.     Bilateral lower extremity swelling    Surgical bandages reinforced with Tegaderm.     Review of Systems   All other systems reviewed and are negative.      Objective   Physical Exam  Constitutional:       General: She is not in acute distress.     Appearance: Normal appearance.   HENT:      Head: Normocephalic and atraumatic.      Nose: Nose normal.      Mouth/Throat:      Mouth: Mucous membranes are dry.   Pulmonary:      Effort: Pulmonary effort is normal.   Abdominal:      General: Abdomen is flat.      Palpations: Abdomen is soft.   Musculoskeletal:         General: Swelling present. Normal range of motion.      Cervical back: Normal range of motion and neck supple.   Skin:     General: Skin is warm and dry.      Findings: Abrasion and rash present.   Neurological:      General: No focal deficit present.      Mental Status: She is alert and oriented to person, place, and time.   Psychiatric:         Mood and Affect: Mood normal.         Assessment/Plan     Rash  Prescription of Benadryl sent to pharmacy. I advised against driving or handeling macheniry while she is taking it due to drowsiness.    Swelling  Prescription of compression hose and stocking given. I advised her to use them during the day  I advised her to follow up with her PCP for further management    OAB  Follow up with Dr sanchez as scheduled for interstim stage 2       Milana Coe PA-C 09/05/24 9:55 AM

## 2024-09-06 ENCOUNTER — DOCUMENTATION (OUTPATIENT)
Dept: UROLOGY | Facility: CLINIC | Age: 74
End: 2024-09-06
Payer: COMMERCIAL

## 2024-09-06 NOTE — PROGRESS NOTES
The patient has noted a greater than 75% improvement in her lower urinary tract symptoms with her InterStim stage I device.  She strongly desires to proceed with IPG implantation.  She is very satisfied with her procedure and wishes to refer a friend.    JALEN Costa MD

## 2024-09-09 DIAGNOSIS — Z11.1 PPD SCREENING TEST: ICD-10-CM

## 2024-09-10 ENCOUNTER — CLINICAL SUPPORT (OUTPATIENT)
Dept: PRIMARY CARE | Facility: CLINIC | Age: 74
End: 2024-09-10
Payer: COMMERCIAL

## 2024-09-10 ENCOUNTER — ANESTHESIA EVENT (OUTPATIENT)
Dept: OPERATING ROOM | Facility: HOSPITAL | Age: 74
End: 2024-09-10
Payer: COMMERCIAL

## 2024-09-10 DIAGNOSIS — Z11.1 PPD SCREENING TEST: ICD-10-CM

## 2024-09-10 PROBLEM — N39.41 URGE INCONTINENCE: Chronic | Status: ACTIVE | Noted: 2023-09-27

## 2024-09-10 PROCEDURE — 86580 TB INTRADERMAL TEST: CPT | Performed by: INTERNAL MEDICINE

## 2024-09-10 NOTE — DISCHARGE INSTRUCTIONS
Urogynecology Post Operative instructions  Clinic number: [phone]    Call your physicians office or go to the nearest emergency room if you have any of the following:   · Fever higher than 100.4 F, chills, sweats.   · Redness, tenderness, increased swelling or drainage at incision.   · Difficulty swallowing or eating.  · Nausea or vomiting.  · Increased pain or pain that is not controlled.   · Increased cough or productive cough of yellow or green colored phlegm.   · Vaginal bleeding soaking more than a pad/hour.  · Any other symptoms that are concerning to you  · Go IMMEDIATELY to the emergency department if you experience shortness of breath    Medications:     For Pain:   · Tylenol (acetaminophen) and ibuprofen are your first line therapies for pain. You can take each of these medications every 6 (six) hours. You can alternate taking doses of these medications so that you have a pain medication available to take every 3 hours. For example, you can take Tylenol at 9am, then ibuprofen at noon, then Tylenol again at 3 pm, etc.  · You have been given a prescription for a narcotic pain medication, oxycodone, to help with postoperative discomfort.  You can take this medication if you have taken Tylenol and ibuprofen and your pain is still greater than a 4 out of 10. The best way to wean off of narcotic pain medications is by alternating them with Ibuprofen and by slowly lengthening the time between your doses of narcotic pain medication.    · You may also try a heating pad to help relieve your pain. Be certain to protect your skin by placing a towel between you and the heating pad. DO NOT fall asleep with the heating pad in place.     For Constipation:   · Narcotic pain medications cause constipation, so you should take Miralax, once or twice daily as long as you are taking narcotic pain medication.    · If no bowel movement in 48 hours, try Milk of Magnesia.   · Drink 6 glasses of water per day.     Wound Care:    As you heal, your incision will look better and the soreness will go away. You may also feel numbness or a “pins and needle” sensation in the area of your incision.   · You may shower once you return home. Wash the incision(s) gently with soap and water during your regular shower and pat dry with a clean towel.   · DO NOT take a bath or submerge your incision in water (NO swimming or hot tubs) for three (3) weeks.   · Do not put any ointments or creams on your incision for 3 to 4 weeks (they can hinder healing).   · Some vaginal bleeding is normal after vaginal surgery. If you are soaking pads in 1-2 hours, please call the office.       It is very important to keep all of your post operative clinic appointments.

## 2024-09-11 ENCOUNTER — HOSPITAL ENCOUNTER (OUTPATIENT)
Facility: HOSPITAL | Age: 74
Setting detail: OUTPATIENT SURGERY
Discharge: HOME | End: 2024-09-11
Attending: OBSTETRICS & GYNECOLOGY | Admitting: OBSTETRICS & GYNECOLOGY
Payer: COMMERCIAL

## 2024-09-11 ENCOUNTER — TELEPHONE (OUTPATIENT)
Dept: UROLOGY | Facility: CLINIC | Age: 74
End: 2024-09-11

## 2024-09-11 ENCOUNTER — ANESTHESIA (OUTPATIENT)
Dept: OPERATING ROOM | Facility: HOSPITAL | Age: 74
End: 2024-09-11
Payer: COMMERCIAL

## 2024-09-11 VITALS
BODY MASS INDEX: 33.27 KG/M2 | HEART RATE: 75 BPM | SYSTOLIC BLOOD PRESSURE: 146 MMHG | TEMPERATURE: 97 F | WEIGHT: 207.01 LBS | DIASTOLIC BLOOD PRESSURE: 83 MMHG | RESPIRATION RATE: 16 BRPM | OXYGEN SATURATION: 100 % | HEIGHT: 66 IN

## 2024-09-11 DIAGNOSIS — N39.41 URGE INCONTINENCE: Primary | ICD-10-CM

## 2024-09-11 PROCEDURE — C1787 PATIENT PROGR, NEUROSTIM: HCPCS | Performed by: OBSTETRICS & GYNECOLOGY

## 2024-09-11 PROCEDURE — 3700000002 HC GENERAL ANESTHESIA TIME - EACH INCREMENTAL 1 MINUTE: Performed by: OBSTETRICS & GYNECOLOGY

## 2024-09-11 PROCEDURE — 2500000004 HC RX 250 GENERAL PHARMACY W/ HCPCS (ALT 636 FOR OP/ED): Performed by: ANESTHESIOLOGIST ASSISTANT

## 2024-09-11 PROCEDURE — 2500000005 HC RX 250 GENERAL PHARMACY W/O HCPCS: Performed by: OBSTETRICS & GYNECOLOGY

## 2024-09-11 PROCEDURE — A4649 SURGICAL SUPPLIES: HCPCS | Performed by: OBSTETRICS & GYNECOLOGY

## 2024-09-11 PROCEDURE — 2500000004 HC RX 250 GENERAL PHARMACY W/ HCPCS (ALT 636 FOR OP/ED): Mod: JZ | Performed by: OBSTETRICS & GYNECOLOGY

## 2024-09-11 PROCEDURE — 2500000004 HC RX 250 GENERAL PHARMACY W/ HCPCS (ALT 636 FOR OP/ED): Performed by: OBSTETRICS & GYNECOLOGY

## 2024-09-11 PROCEDURE — 7100000009 HC PHASE TWO TIME - INITIAL BASE CHARGE: Performed by: OBSTETRICS & GYNECOLOGY

## 2024-09-11 PROCEDURE — 64590 INS/RPL PRPH SAC/GSTR NPG/R: CPT | Performed by: OBSTETRICS & GYNECOLOGY

## 2024-09-11 PROCEDURE — 2720000007 HC OR 272 NO HCPCS: Performed by: OBSTETRICS & GYNECOLOGY

## 2024-09-11 PROCEDURE — 2780000003 HC OR 278 NO HCPCS: Performed by: OBSTETRICS & GYNECOLOGY

## 2024-09-11 PROCEDURE — A64590 PR IMPLANT PERIPH/GASTRIC NEUROSTIM/RECEIVER: Performed by: ANESTHESIOLOGIST ASSISTANT

## 2024-09-11 PROCEDURE — A64590 PR IMPLANT PERIPH/GASTRIC NEUROSTIM/RECEIVER: Performed by: ANESTHESIOLOGY

## 2024-09-11 PROCEDURE — 7100000001 HC RECOVERY ROOM TIME - INITIAL BASE CHARGE: Performed by: OBSTETRICS & GYNECOLOGY

## 2024-09-11 PROCEDURE — 7100000002 HC RECOVERY ROOM TIME - EACH INCREMENTAL 1 MINUTE: Performed by: OBSTETRICS & GYNECOLOGY

## 2024-09-11 PROCEDURE — 7100000010 HC PHASE TWO TIME - EACH INCREMENTAL 1 MINUTE: Performed by: OBSTETRICS & GYNECOLOGY

## 2024-09-11 PROCEDURE — 3700000001 HC GENERAL ANESTHESIA TIME - INITIAL BASE CHARGE: Performed by: OBSTETRICS & GYNECOLOGY

## 2024-09-11 PROCEDURE — 3600000004 HC OR TIME - INITIAL BASE CHARGE - PROCEDURE LEVEL FOUR: Performed by: OBSTETRICS & GYNECOLOGY

## 2024-09-11 PROCEDURE — 99100 ANES PT EXTEME AGE<1 YR&>70: CPT | Performed by: ANESTHESIOLOGY

## 2024-09-11 PROCEDURE — C1889 IMPLANT/INSERT DEVICE, NOC: HCPCS | Performed by: OBSTETRICS & GYNECOLOGY

## 2024-09-11 PROCEDURE — 3600000009 HC OR TIME - EACH INCREMENTAL 1 MINUTE - PROCEDURE LEVEL FOUR: Performed by: OBSTETRICS & GYNECOLOGY

## 2024-09-11 DEVICE — ENVELOPE NMRM6122 NEURO ABSORB MED US
Type: IMPLANTABLE DEVICE | Site: SACRUM | Status: FUNCTIONAL
Brand: TYRX™ NEURO ABSORBABLE ANTIBACTERIAL ENVELOPE - MEDIUM

## 2024-09-11 DEVICE — NEUROSTIMULATOR, INTERSTIM X, RECHARGE-FREE: Type: IMPLANTABLE DEVICE | Site: SACRUM | Status: FUNCTIONAL

## 2024-09-11 RX ORDER — PROPOFOL 10 MG/ML
INJECTION, EMULSION INTRAVENOUS AS NEEDED
Status: DISCONTINUED | OUTPATIENT
Start: 2024-09-11 | End: 2024-09-11

## 2024-09-11 RX ORDER — FENTANYL CITRATE 50 UG/ML
INJECTION, SOLUTION INTRAMUSCULAR; INTRAVENOUS AS NEEDED
Status: DISCONTINUED | OUTPATIENT
Start: 2024-09-11 | End: 2024-09-11

## 2024-09-11 RX ORDER — CEPHALEXIN 500 MG/1
500 CAPSULE ORAL 2 TIMES DAILY
Qty: 10 CAPSULE | Refills: 0 | Status: SHIPPED | OUTPATIENT
Start: 2024-09-11 | End: 2024-09-16

## 2024-09-11 RX ORDER — ONDANSETRON HYDROCHLORIDE 2 MG/ML
INJECTION, SOLUTION INTRAVENOUS AS NEEDED
Status: DISCONTINUED | OUTPATIENT
Start: 2024-09-11 | End: 2024-09-11

## 2024-09-11 RX ORDER — LIDOCAINE HYDROCHLORIDE 10 MG/ML
INJECTION, SOLUTION INTRAVENOUS AS NEEDED
Status: DISCONTINUED | OUTPATIENT
Start: 2024-09-11 | End: 2024-09-11

## 2024-09-11 RX ORDER — CEFAZOLIN SODIUM 2 G/100ML
2 INJECTION, SOLUTION INTRAVENOUS ONCE
Status: COMPLETED | OUTPATIENT
Start: 2024-09-11 | End: 2024-09-11

## 2024-09-11 RX ORDER — SODIUM CHLORIDE, SODIUM LACTATE, POTASSIUM CHLORIDE, CALCIUM CHLORIDE 600; 310; 30; 20 MG/100ML; MG/100ML; MG/100ML; MG/100ML
100 INJECTION, SOLUTION INTRAVENOUS CONTINUOUS
Status: DISCONTINUED | OUTPATIENT
Start: 2024-09-11 | End: 2024-09-11 | Stop reason: HOSPADM

## 2024-09-11 RX ORDER — MIDAZOLAM HYDROCHLORIDE 1 MG/ML
INJECTION, SOLUTION INTRAMUSCULAR; INTRAVENOUS AS NEEDED
Status: DISCONTINUED | OUTPATIENT
Start: 2024-09-11 | End: 2024-09-11

## 2024-09-11 RX ORDER — BUPIVACAINE HCL/EPINEPHRINE 0.5-1:200K
VIAL (ML) INJECTION AS NEEDED
Status: DISCONTINUED | OUTPATIENT
Start: 2024-09-11 | End: 2024-09-11 | Stop reason: HOSPADM

## 2024-09-11 SDOH — HEALTH STABILITY: MENTAL HEALTH: CURRENT SMOKER: 0

## 2024-09-11 ASSESSMENT — PAIN - FUNCTIONAL ASSESSMENT
PAIN_FUNCTIONAL_ASSESSMENT: 0-10

## 2024-09-11 ASSESSMENT — PAIN SCALES - GENERAL
PAIN_LEVEL: 0
PAINLEVEL_OUTOF10: 0 - NO PAIN
PAINLEVEL_OUTOF10: 0 - NO PAIN

## 2024-09-11 NOTE — PERIOPERATIVE NURSING NOTE
Patient alert and oriented. Surgical site clean, dry and intact with ice pack applied. Patient positioned on side for comfort. VS stable.

## 2024-09-11 NOTE — TELEPHONE ENCOUNTER
Spoke with patient. She states that she had a bowel movement and now is having active bleeding at the site from her procedure earlier today. Message sent to Dr. Costa and he will call the patient to discuss.    Shyanne Loera RN

## 2024-09-11 NOTE — ANESTHESIA PREPROCEDURE EVALUATION
Patient: Oxana Rao    Procedure Information       Date/Time: 09/11/24 0715    Procedure: Stage II InterStim    Location: [facility] 01 / [facility]    Surgeons: Henry Costa MD            Relevant Problems   Anesthesia (within normal limits)      Cardiac   (+) Essential hypertension   (+) Hyperlipidemia      Pulmonary   (+) Asthma (HHS-HCC)      Neuro (within normal limits)      GI   (+) Gastroesophageal reflux disease      /Renal (within normal limits)      Liver (within normal limits)      Endocrine   (+) Hyperparathyroidism (Multi)   (+) Obesity      Hematology (within normal limits)      Musculoskeletal   (+) Fibromyalgia   (+) Osteoarthritis of knee      HEENT (within normal limits)      ID   (+) H. pylori infection      Skin   (+) Eczema      GYN (within normal limits)       Clinical information reviewed:   Tobacco  Allergies  Meds   Med Hx  Surg Hx   Fam Hx  Soc Hx        NPO Detail:  NPO/Void Status  Date of Last Liquid: 09/10/24  Time of Last Liquid: 2200  Date of Last Solid: 09/10/24  Time of Last Solid: 1900  Time of Last Void: 0430         Physical Exam    Airway  Mallampati: II  TM distance: >3 FB  Neck ROM: full     Cardiovascular - normal exam     Dental - normal exam     Pulmonary - normal exam     Abdominal            Anesthesia Plan    History of general anesthesia?: yes  History of complications of general anesthesia?: no    ASA 3     MAC     The patient is not a current smoker.  Patient was not previously instructed to abstain from smoking on day of procedure.  Patient did not smoke on day of procedure.  Education provided regarding risk of obstructive sleep apnea.  intravenous induction   Anesthetic plan and risks discussed with patient.    Plan discussed with CRNA and CAA.

## 2024-09-11 NOTE — SIGNIFICANT EVENT
Pt arrived to phase II bed 2 via cart, see VS posted for 7092.  Pt denies any c/o pain or nausea, tolerating liquids and crackers well.  Denies any needs. Pt able to self dress.  Family member called to bedside but family member stepped out temporarily.

## 2024-09-11 NOTE — INTERVAL H&P NOTE
H&P reviewed. The patient was examined and there are no changes to the H&P.  The patient has had a successful stage I InterStim trial with greater than 50% improvement in her lower urinary tract symptoms.  She wishes to proceed with a stage II IPG implantation.

## 2024-09-11 NOTE — SIGNIFICANT EVENT
Pt on phone with daughter to arrange for transport.  Discharge instructions reviewed with pt at this time.  Pt verbalizes understanding of instruction and agrees with follow up plan of care.  Belongings returned to patient and patient dressed.  Awaiting pt's ride to arrive.

## 2024-09-11 NOTE — ANESTHESIA POSTPROCEDURE EVALUATION
Patient: Oxana Rao    Procedure Summary       Date: 09/11/24 Room / Location: [facility] / [facility]    Anesthesia Start: 0708 Anesthesia Stop: 0740    Procedure: Stage II InterStim Diagnosis:       Urge incontinence      (Urge incontinence [N39.41])    Surgeons: Henry Costa MD Responsible Provider: Jethro Chaidez MD    Anesthesia Type: MAC ASA Status: 3            Anesthesia Type: MAC    Vitals Value Taken Time   /83 09/11/24 0750   Temp 36.3 °C (97.3 °F) 09/11/24 0740   Pulse 72 09/11/24 0750   Resp 14 09/11/24 0750   SpO2 99 % 09/11/24 0750       Anesthesia Post Evaluation    Patient location during evaluation: PACU  Patient participation: complete - patient participated  Level of consciousness: awake  Pain score: 0  Pain management: adequate  Multimodal analgesia pain management approach  Airway patency: patent  Two or more strategies used to mitigate risk of obstructive sleep apnea  Cardiovascular status: acceptable  Respiratory status: acceptable  Hydration status: acceptable  Postoperative Nausea and Vomiting: none        There were no known notable events for this encounter.

## 2024-09-11 NOTE — OP NOTE
Stage II InterStim Operative Note     Date: 2024  OR Location: [facility]    Name: Oxana Rao, : 1950, Age: 74 y.o., MRN: [id number], Sex: female    Diagnosis  Pre-op Diagnosis      * Urge incontinence [N39.41] Post-op Diagnosis     * Urge incontinence [N39.41]     Procedures  Stage II InterStim  95371 - MA INS/RPLC PERPH SAC/GSTRC NPG/RCVR PCKT CRTJ&CONN      Surgeons      * Henry Costa - Primary    Resident/Fellow/Other Assistant:  Surgeons and Role:  * No surgeons found with a matching role *    Procedure Summary  Anesthesia: Monitor Anesthesia Care  ASA: III  Anesthesia Staff: Anesthesiologist: Jethro Chaidez MD  C-AA: GOYO Tamayo  Estimated Blood Loss: Less than 5 mL  Intra-op Medications:   Administrations occurring from 0715 to 0800 on 24:   Medication Name Total Dose   BUPivacaine-EPINEPHrine (Marcaine w/EPI) 0.5 %-1:200,000 injection 9 mL   lactated Ringer's infusion 153.33 mL              Anesthesia Record               Intraprocedure I/O Totals          Intake    ceFAZolin (Ancef) 2 g in dextrose (iso)  mL 100.00 mL    Total Intake 100 mL       Output    Est. Blood Loss 1 mL    Total Output 1 mL       Net    Net Volume 99 mL          Specimen: No specimens collected     Staff:   Circulator: Karyna Lisa Person: Venus         Drains and/or Catheters: * None in log *    Tourniquet Times:         Implants:  Implants       Type Name Action Serial No.      Neuro Interventional Implant ENVELOPE, NEURO, ABSORBABLE, MED - [id number] Implanted       NEUROSTIMULATOR, INTERSTIM X, RECHARGE-FREE - [id number] - [id number] Implanted [id number]              Findings: No evidence of infection in the right sided IPG site.  Normal impedances across all 4 leads.    Indications: Oxana Rao is an 74 y.o. female who is having surgery for Urge incontinence [N39.41].  The patient has had a greater than 75% improvement in lower urinary tract symptoms during her stage I InterStim  trial.  She is elected to proceed with IPG placement and presents today for this procedure.    The patient was seen in the preoperative area. The risks, benefits, complications, treatment options, non-operative alternatives, expected recovery and outcomes were discussed with the patient. The possibilities of reaction to medication, pulmonary aspiration, injury to surrounding structures, bleeding, recurrent infection, the need for additional procedures, failure to diagnose a condition, and creating a complication requiring transfusion or operation were discussed with the patient. The patient concurred with the proposed plan, giving informed consent.  The site of surgery was properly noted/marked if necessary per policy. The patient has been actively warmed in preoperative area. Preoperative antibiotics have been ordered and given within 1 hours of incision. Venous thrombosis prophylaxis are not indicated.    Procedure Details:  After consent was signed and placed in the chart, the patient was taken to the operating room where anesthesia was found be adequate.  The patient was prepared and draped in the normal sterile fashion in the prone position.  The patient received appropriate IV preoperative antibiotics prior to incision.  The area of the previous buttock incision on the right was anesthetized using 1% lidocaine with epinephrine.  The incision was opened with no concerns for infection.  The permanent and temporary wires were brought to the surface and the temporary wire removed from the permanent lead and removed from the surgical field. The wound was irrigated copiously with normal saline.  The IPG was then connected to the permanent lead and placed within a TYRX antibiotic pouch.  This was then placed in the pocket and interrogated with normal impedance across all 4 leads. The wound was closed using several interrupted deep 2-0 Vicryl sutures and then the skin was closed with a running 4-0 Vicryl suture.   Dermabond was then applied and the procedure terminated.    The patient tolerated the procedure well.  Sponge lap needle counts correct x2.  The patient was taken to the postanesthesia care unit to undergo complex reprogramming.   Complications:  None; patient tolerated the procedure well.    Disposition: PACU - hemodynamically stable.  Condition: stable         Additional Details:     Attending Attestation:     Henry Costa  Phone Number: [phone]

## 2024-09-12 ENCOUNTER — CLINICAL SUPPORT (OUTPATIENT)
Dept: PRIMARY CARE | Facility: CLINIC | Age: 74
End: 2024-09-12
Payer: COMMERCIAL

## 2024-09-12 DIAGNOSIS — Z11.1 PPD SCREENING TEST: ICD-10-CM

## 2024-09-12 LAB
INDURATION: 0 MM
TB SKIN TEST: NEGATIVE

## 2024-09-30 RX ORDER — CYANOCOBALAMIN 1000 UG/ML
1000 INJECTION, SOLUTION INTRAMUSCULAR; SUBCUTANEOUS ONCE
Status: COMPLETED | OUTPATIENT
Start: 2024-10-03 | End: 2024-10-03

## 2024-10-03 ENCOUNTER — APPOINTMENT (OUTPATIENT)
Dept: SURGERY | Facility: CLINIC | Age: 74
End: 2024-10-03
Payer: COMMERCIAL

## 2024-10-03 DIAGNOSIS — K90.9 INTESTINAL MALABSORPTION, UNSPECIFIED TYPE (HHS-HCC): ICD-10-CM

## 2024-10-03 ASSESSMENT — LIFESTYLE VARIABLES
SKIP TO QUESTIONS 9-10: 1
AUDIT-C TOTAL SCORE: 1
HOW OFTEN DO YOU HAVE A DRINK CONTAINING ALCOHOL: MONTHLY OR LESS
HOW OFTEN DO YOU HAVE SIX OR MORE DRINKS ON ONE OCCASION: NEVER
HOW MANY STANDARD DRINKS CONTAINING ALCOHOL DO YOU HAVE ON A TYPICAL DAY: 1 OR 2

## 2024-10-03 ASSESSMENT — COLUMBIA-SUICIDE SEVERITY RATING SCALE - C-SSRS
1. IN THE PAST MONTH, HAVE YOU WISHED YOU WERE DEAD OR WISHED YOU COULD GO TO SLEEP AND NOT WAKE UP?: NO
6. HAVE YOU EVER DONE ANYTHING, STARTED TO DO ANYTHING, OR PREPARED TO DO ANYTHING TO END YOUR LIFE?: NO
2. HAVE YOU ACTUALLY HAD ANY THOUGHTS OF KILLING YOURSELF?: NO

## 2024-10-03 ASSESSMENT — PAIN SCALES - GENERAL: PAINLEVEL: 0-NO PAIN

## 2024-10-18 ENCOUNTER — OFFICE VISIT (OUTPATIENT)
Dept: UROLOGY | Facility: CLINIC | Age: 74
End: 2024-10-18
Payer: COMMERCIAL

## 2024-10-18 DIAGNOSIS — R10.2 PELVIC PAIN: ICD-10-CM

## 2024-10-18 DIAGNOSIS — N95.2 ATROPHIC VAGINITIS: ICD-10-CM

## 2024-10-18 DIAGNOSIS — R60.9 NON-PITTING EDEMA: ICD-10-CM

## 2024-10-18 DIAGNOSIS — N39.41 URGE INCONTINENCE: Primary | ICD-10-CM

## 2024-10-18 PROCEDURE — 99211 OFF/OP EST MAY X REQ PHY/QHP: CPT | Performed by: OBSTETRICS & GYNECOLOGY

## 2024-10-18 PROCEDURE — 1160F RVW MEDS BY RX/DR IN RCRD: CPT | Performed by: OBSTETRICS & GYNECOLOGY

## 2024-10-18 PROCEDURE — 1159F MED LIST DOCD IN RCRD: CPT | Performed by: OBSTETRICS & GYNECOLOGY

## 2024-10-18 PROCEDURE — 1157F ADVNC CARE PLAN IN RCRD: CPT | Performed by: OBSTETRICS & GYNECOLOGY

## 2024-10-18 PROCEDURE — 1036F TOBACCO NON-USER: CPT | Performed by: OBSTETRICS & GYNECOLOGY

## 2024-10-18 NOTE — PROGRESS NOTES
Subjective   Patient ID: Oxana Rao is a 74 y.o. female who presents for follow up.      HPI      73-year-old with history of exquisite pelvic floor pain, multiple mid urethral sling and vaginal surgeries, urge urinary incontinence, and vaginal atrophy having undergone 100 units of Botox 11/10/2023 and 03/21/2024. Presenting following 8/28/2024 InterStim stage I followed by 9/11/2024 InterStim II    The patient is extremely satisfied with her InterStim device. She denies any complaints and notes she has better control in her LUTS. IPG site healing well.     She denies any vaginal complaints, no abnormal bleeding or discharge.     She denies any bowel related complaints, no fecal or flatal incontinence.    She has no other complaints.     This visit was performed through telemedicine   73-year-old with history of exquisite pelvic floor pain, multiple mid urethral sling and vaginal surgeries, urge urinary incontinence, and vaginal atrophy having undergone 100 units of Botox 11/10/2023 and 03/21/2024.      The patient has about 2 months of benefits wit her intradetrusor Botox. She presents to discuss her therapy options. PTNS and InterStim were discussed at length.  She is noting daily incontinence episodes and urgency and frequency roughly every 1-2 hours.  She denies any UTI symptoms.    She denies any vaginal complaints, no abnormal bleeding or discharge.     She denies any bowel related complaints, no fecal or flatal incontinence.    She has no other complaints.    From Previous note  73-year-old with history of exquisite pelvic floor pain, multiple mid urethral sling and vaginal surgeries, urge urinary incontinence, and vaginal atrophy having undergone 100 units of Botox 11/10/2023 and today 03/21/2024.       She has no other complaints.     From Previous note  This visit was performed through telemedicine   73-year-old with history of exquisite pelvic floor pain, multiple mid urethral sling and vaginal  surgeries, urge urinary incontinence, and vaginal atrophy having undergone 100 units of Botox 11/10/2023     Since her last visit with us she has had improvement in her urinary symptoms. She does still feel urgency every 3-4 hours. She notes that there are times where she has not been waking up at night as much but she does watch how much she drinks.  She does feel that it is time for repeat 100 units Botox.  She wishes to have this performed at her earliest convenience.    She has no other complaints.      From Previous note   73-year-old with history of exquisite pelvic floor pain, multiple mid urethral sling and vaginal surgeries, urge urinary incontinence, and vaginal atrophy having undergone 100 units of Botox  11/10/2023     The patient appears to be noting benefits in her lower urinary tract complaints with her recent intradetrusor Botox. PVR today si 150 ml. She does continues to note rare episodes of incontinence. She notes 1-2 episodes of nocturia but denies any enuresis. She is urinating 3 times daily. She denies any UTI like symptoms.     She denies any bowel related complaints, no fecal or flatal incontinence.    She denies any vaginal complaints, no abnormal bleeding or discharge. She denies any pelvic pain.    She has no other complaints.    From Previous note  73-year-old with history of exquisite pelvic floor pain, multiple mid urethral sling and vaginal surgeries, urge urinary incontinence, and vaginal atrophy having undergone 100 units of Botox today 11/10/2023     She has no other complaints.     From Previous note  This visit was performed through telemedicine  73-year-old with history of exquisite pelvic floor pain, multiple mid urethral sling and vaginal surgeries, urge urinary incontinence, and vaginal atrophy presenting to discuss urodynamics.     She has no new complaints.     From previous note  73-year-old with history of exquisite pelvic floor pain, multiple mid urethral sling and  vaginal surgeries, urge urinary incontinence, and vaginal atrophy.     The patient notes worsening lower urinary tract complaints. .She notes 3-4 episodes of nocturia and has to rush to the bathroom. She voids 3-5 times during the day with episodes of incontinence in between. She does also note urinary leakage on movement. She has to wear depends to avoid accidents. She has also followed up with PFPT with no benefits. She denies any UTI likes symptoms today.     She denies any vaginal complaints, no abnormal vaginal bleeding or discharge.      She denies any bowel related complaints, no fecal or flatal incontinence.      She has no other complaints.            From previous note  72-year-old with history of exquisite pelvic floor pain, multiple mid urethral sling and vaginal surgeries, urge urinary incontinence, and vaginal atrophy.     The patient was last seen in August 2022. The patient complaints or worsening urinary urgency, she states she need to void as soon as she sees a bathroom. She utilizes pads to avoid accidents as she leaks. She denies any UTI like symptoms. She is not continuing her pelvic floor physical therapy. She is not utilizing the vaginal estrogen cream.     She denies any vaginal complaints, no abnormal vaginal bleeding or discharge.     She denies any bowel related complaints, no fecal or flatal incontinence.     She has no other complaints.     From previous note  72-year-old with history of exquisite pelvic floor pain, multiple mid urethral sling and vaginal surgeries, urge urinary incontinence, and vaginal atrophy presenting for follow-up.     The patient is following up with pelvic floor physical therapy and is overall very satisfied with this. She did need to miss recent appointment due to bronchitis complaints. However she wishes to continue this moving forward.     She was previously recommended to start amitriptyline which she has not done. She was also counseled regarding her  headache complaints to start this medication as well. She desires a new written prescription for this.     [facility] did not provide her urodynamic reports after multiple attempts. She does state that she will provide this at her earliest convenience.     She has no new complaints.     From previous note  72-year-old presenting as a referral from Dr. Park with complicated surgical pelvic floor history and urinary incontinence complaints.     The patient underwent a hysterectomy many years ago. In 1997 she believes she underwent an anterior colporrhaphy. In 2005 and 6 she believes she had a sling placed and again in 2014 another sling was placed. In 2019 she believes she may have had a third sling placed. In 2021 Dr. Maliha Benitez excised or released the sling. She denies any vaginal bulge symptoms and does not believe that any her prior surgeries was performed for this except that in 1997. She states that her surgeries performed in 2005, 2014, and 2019 were to help with her urinary incontinence complaints. Prior to her 2021 surgery, she had noted pain with urination and she believes that the sling was obstructive. Since that time she denies any pain.     She does note episodic leaking with activity and during intercourse. She otherwise denies leaking with coughing or sneezing. She does feel significant urgency roughly every 2 hours. She notes 2-3 episodes of nocturia and denies enuresis. She has previously utilized oxybutynin and most recently Myrbetriq without improvement in her symptoms. She denies a history of nephrolithiasis, chronic urinary tract infections or any gross hematuria. She has previously been recommended to undergo pelvic floor physical therapy but has not followed up with this.     She otherwise has not been able to be sexually active in the last 2 to 3 years due to significant pain vaginally. She otherwise denies any abnormal vaginal bleeding or discharge. She does note vaginal dryness.     Patient  denies any constipation complaints. She denies any fecal incontinence or flatal incontinence.     She has no other complaints.      Review of Systems  Constitutional: No fever, No chills and No fatigue.   Eyes: No vision problems and No dryness of the eyes.   ENT: No dry mouth, No hearing loss and No nosebleeds.   Cardiovascular: No chest pain, No palpitations and No orthopnea.   Respiratory: No shortness of breath, No cough and No wheezing.   Gastrointestinal: No abdominal pain, No constipation, No nausea, No diarrhea, No vomiting and No melena.   Genitourinary: As noted in HPI.   Musculoskeletal: No back pain, No myalgias, No muscle weakness, No joint swelling and No leg edema.   Integumentary: No rashes, No skin lesion and No itching.   Neurological: No headache, No numbness and No dizziness.   Psychiatric: No sleep disturbances, No anxiety and No depression.   Endocrine: No hot flashes, No loss of hair and No hirsutism.   Hematologic/Lymphatic: No swollen glands, No tendency for easy bleeding and No tendency for easy bruising.   All other systems have been reviewed and are negative for complaint.        Objective   Physical Exam    PHYSICAL EXAMINATION:  No LMP recorded. Patient is postmenopausal.  There is no height or weight on file to calculate BMI.  There were no vitals taken for this visit.  General Appearance: well appearing  Neuro: Alert and oriented   HEENT: mucous membranes moist, neck supple  Resp: No respiratory distress, normal work of breathing  MSK: normal range of motion, gait appropriate    The right sided IPG site is well-healed.  Remanent Vicryl suture was removed today.    Assessment/Plan      73-year-old with history of exquisite pelvic floor pain, multiple mid urethral sling and vaginal surgeries, urge urinary incontinence, and vaginal atrophy having undergone 100 units of Botox 11/10/2023 and 03/21/2024. Presenting following 8/28/2024 InterStim stage I followed by 9/11/2024 InterStim II  "with significant improvements to her lower urinary tract symptoms     #1 the patient is extremely satisfied with her InterStim, now being able to wear \"normal\" underwear.  She feels well-controlled from an urgency, frequency, and incontinence standpoint.  Previously the patient was noting only roughly 2 months of benefit following her 100 units Botox.  She has previously had excellent results following her 100 units Botox 11/10/2023.  We have previously discussed appropriate positioning on the toilet and utilizing a squatty potty.  We have previously discussed her urodynamics. It was notable for hypersensitivity with normal compliance and emptying without having evidence of obstruction from her prior mid urethral slings. We have been unable to obtain her records from [facility] unfortunately. She has not had any benefits to her lower urinary tract symptoms with anticholinergic and beta 3 agonist therapies in the past. However she has been noted to have exquisitely tight and tender pelvic floor muscles. She was previously having excellent results with her pelvic floor physical therapy. We again discussed the possibility of Valium therapy in the future and she is not interested in this at this time. She is not interested in utilizing Gemtesa as this is cost prohibitive to her.  She is overall extremely satisfied with her InterStim and will continue this moving forward.    #2 we have previously discussed our lower urinary tract symptoms may be related to her exquisite pelvic floor pain. The patient has previously utilized amitriptyline with minimal benefit.  She has no pelvic pain complaints at this time.     3. We have previously discussed the patient's vaginal atrophy. We discussed the safety, efficacy, proper utilization of vaginal estrogen therapy and she we will continue this 3 times a week moving forward.     #4 the patient will follow-up on an as-needed basis moving forward.     JALEN Costa MD     Scribe " Attestation  By signing my name below, I, Jennifer Arceo attest that this documentation has been prepared under the direction and in the presence of Henry Costa MD. All medical record entries made by the Scribe were at my direction or personally dictated by me. I have reviewed the chart and agree that the record accurately reflects my personal performance of the history, physical exam, discussion and plan.

## 2024-10-18 NOTE — PATIENT INSTRUCTIONS
Please contact the [employer]St[employer] group with any questions about your device by contacting [phone].    Call the clinic with questions or concerns.    [phone]

## 2024-11-04 ENCOUNTER — APPOINTMENT (OUTPATIENT)
Dept: SURGERY | Facility: CLINIC | Age: 74
End: 2024-11-04
Payer: COMMERCIAL

## 2024-11-05 ENCOUNTER — OFFICE VISIT (OUTPATIENT)
Dept: SURGERY | Facility: CLINIC | Age: 74
End: 2024-11-05
Payer: COMMERCIAL

## 2024-11-05 DIAGNOSIS — K90.9 INTESTINAL MALABSORPTION, UNSPECIFIED TYPE (HHS-HCC): ICD-10-CM

## 2024-11-05 DIAGNOSIS — E53.8 VITAMIN B12 DEFICIENCY: Primary | ICD-10-CM

## 2024-11-05 PROCEDURE — 1036F TOBACCO NON-USER: CPT

## 2024-11-05 PROCEDURE — 1159F MED LIST DOCD IN RCRD: CPT

## 2024-11-05 PROCEDURE — 96372 THER/PROPH/DIAG INJ SC/IM: CPT

## 2024-11-05 PROCEDURE — 1157F ADVNC CARE PLAN IN RCRD: CPT

## 2024-11-05 PROCEDURE — 1126F AMNT PAIN NOTED NONE PRSNT: CPT

## 2024-11-05 RX ORDER — CYANOCOBALAMIN 1000 UG/ML
1000 INJECTION, SOLUTION INTRAMUSCULAR; SUBCUTANEOUS ONCE
Status: COMPLETED | OUTPATIENT
Start: 2024-11-05 | End: 2024-11-05

## 2024-11-05 ASSESSMENT — PATIENT HEALTH QUESTIONNAIRE - PHQ9
1. LITTLE INTEREST OR PLEASURE IN DOING THINGS: NOT AT ALL
2. FEELING DOWN, DEPRESSED OR HOPELESS: NOT AT ALL
SUM OF ALL RESPONSES TO PHQ9 QUESTIONS 1 AND 2: 0

## 2024-11-05 ASSESSMENT — COLUMBIA-SUICIDE SEVERITY RATING SCALE - C-SSRS
1. IN THE PAST MONTH, HAVE YOU WISHED YOU WERE DEAD OR WISHED YOU COULD GO TO SLEEP AND NOT WAKE UP?: NO
2. HAVE YOU ACTUALLY HAD ANY THOUGHTS OF KILLING YOURSELF?: NO
6. HAVE YOU EVER DONE ANYTHING, STARTED TO DO ANYTHING, OR PREPARED TO DO ANYTHING TO END YOUR LIFE?: NO

## 2024-11-05 ASSESSMENT — PAIN SCALES - GENERAL: PAINLEVEL_OUTOF10: 0-NO PAIN

## 2024-12-04 ENCOUNTER — APPOINTMENT (OUTPATIENT)
Dept: SURGERY | Facility: CLINIC | Age: 74
End: 2024-12-04
Payer: COMMERCIAL

## 2024-12-05 ENCOUNTER — APPOINTMENT (OUTPATIENT)
Dept: SURGERY | Facility: CLINIC | Age: 74
End: 2024-12-05
Payer: COMMERCIAL

## 2024-12-10 ENCOUNTER — APPOINTMENT (OUTPATIENT)
Dept: SURGERY | Facility: CLINIC | Age: 74
End: 2024-12-10
Payer: COMMERCIAL

## 2024-12-10 VITALS — BODY MASS INDEX: 28.09 KG/M2 | HEIGHT: 67 IN | WEIGHT: 179 LBS

## 2024-12-10 DIAGNOSIS — K90.9 INTESTINAL MALABSORPTION, UNSPECIFIED TYPE (HHS-HCC): ICD-10-CM

## 2024-12-10 DIAGNOSIS — E53.8 VITAMIN B12 DEFICIENCY: Primary | ICD-10-CM

## 2024-12-10 PROCEDURE — 96372 THER/PROPH/DIAG INJ SC/IM: CPT

## 2024-12-10 PROCEDURE — 1126F AMNT PAIN NOTED NONE PRSNT: CPT

## 2024-12-10 PROCEDURE — 1157F ADVNC CARE PLAN IN RCRD: CPT

## 2024-12-10 PROCEDURE — 3008F BODY MASS INDEX DOCD: CPT

## 2024-12-10 RX ORDER — CYANOCOBALAMIN 1000 UG/ML
1000 INJECTION, SOLUTION INTRAMUSCULAR; SUBCUTANEOUS ONCE
Status: COMPLETED | OUTPATIENT
Start: 2024-12-10 | End: 2024-12-10

## 2024-12-10 ASSESSMENT — ENCOUNTER SYMPTOMS
DEPRESSION: 0
LOSS OF SENSATION IN FEET: 0
OCCASIONAL FEELINGS OF UNSTEADINESS: 0

## 2024-12-10 ASSESSMENT — COLUMBIA-SUICIDE SEVERITY RATING SCALE - C-SSRS
2. HAVE YOU ACTUALLY HAD ANY THOUGHTS OF KILLING YOURSELF?: NO
1. IN THE PAST MONTH, HAVE YOU WISHED YOU WERE DEAD OR WISHED YOU COULD GO TO SLEEP AND NOT WAKE UP?: NO
6. HAVE YOU EVER DONE ANYTHING, STARTED TO DO ANYTHING, OR PREPARED TO DO ANYTHING TO END YOUR LIFE?: NO

## 2024-12-10 ASSESSMENT — PATIENT HEALTH QUESTIONNAIRE - PHQ9
1. LITTLE INTEREST OR PLEASURE IN DOING THINGS: NOT AT ALL
SUM OF ALL RESPONSES TO PHQ9 QUESTIONS 1 AND 2: 0
2. FEELING DOWN, DEPRESSED OR HOPELESS: NOT AT ALL

## 2024-12-10 ASSESSMENT — PAIN SCALES - GENERAL: PAINLEVEL_OUTOF10: 0-NO PAIN

## 2024-12-30 ENCOUNTER — TELEMEDICINE (OUTPATIENT)
Dept: PRIMARY CARE | Facility: CLINIC | Age: 74
End: 2024-12-30
Payer: COMMERCIAL

## 2024-12-30 DIAGNOSIS — R05.1 ACUTE COUGH: Primary | ICD-10-CM

## 2024-12-30 PROCEDURE — 1123F ACP DISCUSS/DSCN MKR DOCD: CPT | Performed by: NURSE PRACTITIONER

## 2024-12-30 PROCEDURE — 1159F MED LIST DOCD IN RCRD: CPT | Performed by: NURSE PRACTITIONER

## 2024-12-30 PROCEDURE — 1158F ADVNC CARE PLAN TLK DOCD: CPT | Performed by: NURSE PRACTITIONER

## 2024-12-30 PROCEDURE — 1157F ADVNC CARE PLAN IN RCRD: CPT | Performed by: NURSE PRACTITIONER

## 2024-12-30 PROCEDURE — 1036F TOBACCO NON-USER: CPT | Performed by: NURSE PRACTITIONER

## 2024-12-30 PROCEDURE — 1125F AMNT PAIN NOTED PAIN PRSNT: CPT | Performed by: NURSE PRACTITIONER

## 2024-12-30 PROCEDURE — 99214 OFFICE O/P EST MOD 30 MIN: CPT | Performed by: NURSE PRACTITIONER

## 2024-12-30 RX ORDER — METHYLPREDNISOLONE 4 MG/1
TABLET ORAL
Qty: 21 TABLET | Refills: 0 | Status: SHIPPED | OUTPATIENT
Start: 2024-12-30 | End: 2025-01-05

## 2024-12-30 RX ORDER — TRIAMCINOLONE ACETONIDE 1 MG/G
OINTMENT TOPICAL
COMMUNITY
Start: 2024-12-19

## 2024-12-30 RX ORDER — GABAPENTIN 300 MG/1
300 CAPSULE ORAL 3 TIMES DAILY
COMMUNITY
Start: 2024-12-26

## 2024-12-30 RX ORDER — BENZONATATE 100 MG/1
100 CAPSULE ORAL 3 TIMES DAILY PRN
Qty: 42 CAPSULE | Refills: 0 | Status: SHIPPED | OUTPATIENT
Start: 2024-12-30 | End: 2025-01-29

## 2024-12-30 RX ORDER — TIZANIDINE 4 MG/1
4 TABLET ORAL 3 TIMES DAILY PRN
COMMUNITY
Start: 2024-12-13 | End: 2025-02-11

## 2024-12-30 RX ORDER — ALBUTEROL SULFATE 90 UG/1
2 INHALANT RESPIRATORY (INHALATION) EVERY 4 HOURS PRN
Qty: 8 G | Refills: 0 | Status: SHIPPED | OUTPATIENT
Start: 2024-12-30

## 2024-12-30 ASSESSMENT — PAIN SCALES - GENERAL: PAINLEVEL_OUTOF10: 4

## 2024-12-30 NOTE — PROGRESS NOTES
With patient's permission this is a telemedicine visit with video and audio.  History Of Present Illness  Oxana Rao is a 74 y.o. female who calls in for Cough (DR HINSON PT- cough, congestion, tightness in chest, ear pain, whooping cough since Friday. Did have fever. Covid test neg. Drinks ginger and lemon tea, and took tylenol//Needs refill on albuterol inhaler).    HPI 74 year old female with a tightness in her chest and cough.  Symptoms started Friday.  Neg covid test.  No appetite drinking.      Past Medical History  She has a past medical history of Asthma, CHF (congestive heart failure), Chronic bronchitis (Multi), Diverticulitis, GERD (gastroesophageal reflux disease), HTN (hypertension), Intestinal malabsorption, and Sleep apnea.    She has no past medical history of Awareness under anesthesia, COPD (chronic obstructive pulmonary disease) (Multi), Delayed emergence from general anesthesia, Diabetes mellitus type I (Multi), Hard to intubate, Malignant hyperthermia, PONV (postoperative nausea and vomiting), Refusal of blood product, or Type 2 diabetes mellitus.    Medications  Current Outpatient Medications   Medication Instructions    albuterol (ProAir HFA) 90 mcg/actuation inhaler 1 puff(s) inhaled as needed wheeze or shortness of breath    CALCIUM ORAL 2 times a day.    cholestyramine (Questran) 4 gram packet 1 packet mixed with water or non-carbonated drink Orally Once a day for 30 day(s)    clotrimazole-betamethasone (Lotrisone) cream APPLY SPARINGLY TO THE AFFECTED AREA(S) TWICE DAILY.    cyanocobalamin (VITAMIN B-12) 1,000 mcg, Every 30 days    diphenhydrAMINE (BENADRYL) 25 mg, oral, Every 6 hours PRN    ergocalciferol (Vitamin D-2) 1.25 MG (58930 UT) capsule TAKE 1 CAPSULE BY MOUTH ONE TIME PER WEEK FOR 90 DAYS    fluticasone (Flonase) 50 mcg/actuation nasal spray 1 spray, Each Nostril, Daily, Shake gently. Before first use, prime pump. After use, clean tip and replace cap.    gabapentin  (NEURONTIN) 300 mg, 3 times daily    hydroCHLOROthiazide (HYDRODIURIL) 25 mg, oral, Daily    multivitamin (multivitamin with folic acid) tablet Multiple Vitamins Oral Tablet   Refills: 0        Start : 8-Jan-2016   Active    omeprazole (PriLOSEC) 40 mg DR capsule TAKE 1 CAPSULE BY MOUTH 30 MINUTES BEFORE MORNING MEAL EVERY DAY FOR 90 DAYS    tiZANidine (ZANAFLEX) 4 mg, 3 times daily PRN    triamcinolone (Kenalog) 0.1 % ointment PLEASE SEE ATTACHED FOR DETAILED DIRECTIONS        Surgical History  She has a past surgical history that includes Total knee arthroplasty (01/08/2016); Shoulder surgery (01/08/2016); Hysterectomy (01/08/2016); Hernia repair (01/08/2016); Appendectomy (01/08/2016); Other surgical history (01/08/2016); Other surgical history (01/08/2016); Other surgical history (05/18/2022); Hysterectomy; Incontinence surgery; Appendectomy; Bunionectomy; XR knee; and Hiatal hernia repair.     Social History  She reports that she has never smoked. She has never been exposed to tobacco smoke. She has never used smokeless tobacco. She reports current alcohol use. She reports that she does not use drugs.    Family History  Family History   Problem Relation Name Age of Onset    Heart attack Mother      Heart disease Mother      Hypertension Mother      Stroke Mother      Asthma Mother      Hypertension Sister      Heart attack Brother      Other (lung/prostate/breast) Brother      Heart disease Brother      Hypertension Brother      Stroke Brother      Asthma Brother      Cancer Brother      Cancer Brother      Asthma Daughter          Allergies  Lactose; Nsaids (non-steroidal anti-inflammatory drug); Shrimp; Latex, natural rubber; Ciprofloxacin; and Tramadol    On video:     Appearance: Normal appearance.      Effort: Respiratory effort is normal. Speaking in full sentences. No respiratory distress.     Mood and Affect: Mood normal.         Thought Content: Thought content normal.         Judgment: Judgment  normal.      Last Recorded Vitals  There were no vitals taken for this visit.  (Vitals are taken by patient at home, if any reported)    Relevant Results      Assessment/Plan   There are no diagnoses linked to this encounter.        Counseling    Medication education:              Education:  The patient is counseled regarding potential side-effects of any and all new medications             Understanding:  Patient expressed understanding             Adherence:  No barriers to adherence identified      Ingrid Agrawal, APRN-CNP

## 2025-01-07 ENCOUNTER — APPOINTMENT (OUTPATIENT)
Dept: SURGERY | Facility: CLINIC | Age: 75
End: 2025-01-07
Payer: COMMERCIAL

## 2025-01-08 NOTE — PROGRESS NOTES
Subjective   Patient ID: Oxana Rao is a 74 y.o. female who presents for No chief complaint on file..  HPI  6 mos fuv from previous b 12/ review labs  START WT: 233   IDEAL WT:158     START EXCESS: 75     HT: 66.75  IN.  Review of Systems  CONSTITUTIONAL:          Chills No.  Fatigue yes.  Fever No.         CARDIOLOGY:          Negative for dizziness, chest pain, palpitations, shortness of breath.         RESPIRATORY:          Sleep Apnea No.  Negative for chest congestion, cough, wheezing.         GASTROENTEROLOGY:          Food Intolerance No.  Abdominal pain No.  Acid reflux No.  Black stools No.  Constipation No.  Diarrhea No.  Loss of appetite no.  Nausea No.  Vomiting No.         UROLOGY:          Negative for dysuria, urinary urgency, kidney stones.         PSYCHOLOGY:          Negative for depression, anxiety, high stress level.    Objective   Physical Exam    Assessment/Plan   {Assess/PlanSmartLinks:52638}         Shyanne Luis LPN 01/08/25 12:51 PM

## 2025-01-10 ENCOUNTER — APPOINTMENT (OUTPATIENT)
Dept: SURGERY | Facility: CLINIC | Age: 75
End: 2025-01-10
Payer: MEDICARE

## 2025-01-15 ENCOUNTER — LAB (OUTPATIENT)
Dept: LAB | Facility: LAB | Age: 75
End: 2025-01-15
Payer: MEDICARE

## 2025-01-15 DIAGNOSIS — K90.9 INTESTINAL MALABSORPTION, UNSPECIFIED TYPE (HHS-HCC): ICD-10-CM

## 2025-01-15 DIAGNOSIS — E21.3 HYPERPARATHYROIDISM (MULTI): ICD-10-CM

## 2025-01-15 DIAGNOSIS — E61.1 IRON DEFICIENCY: ICD-10-CM

## 2025-01-15 DIAGNOSIS — E53.8 VITAMIN B12 DEFICIENCY: ICD-10-CM

## 2025-01-15 LAB
BASOPHILS # BLD AUTO: 0.03 X10*3/UL (ref 0–0.1)
BASOPHILS NFR BLD AUTO: 0.4 %
EOSINOPHIL # BLD AUTO: 0.36 X10*3/UL (ref 0–0.4)
EOSINOPHIL NFR BLD AUTO: 5.3 %
ERYTHROCYTE [DISTWIDTH] IN BLOOD BY AUTOMATED COUNT: 15 % (ref 11.5–14.5)
FERRITIN SERPL-MCNC: 20 NG/ML (ref 8–150)
HCT VFR BLD AUTO: 38.9 % (ref 36–46)
HGB BLD-MCNC: 11.8 G/DL (ref 12–16)
IMM GRANULOCYTES # BLD AUTO: 0.01 X10*3/UL (ref 0–0.5)
IMM GRANULOCYTES NFR BLD AUTO: 0.1 % (ref 0–0.9)
IRON SATN MFR SERPL: 7 % (ref 25–45)
IRON SERPL-MCNC: 33 UG/DL (ref 35–150)
LYMPHOCYTES # BLD AUTO: 2.01 X10*3/UL (ref 0.8–3)
LYMPHOCYTES NFR BLD AUTO: 29.4 %
MCH RBC QN AUTO: 27.5 PG (ref 26–34)
MCHC RBC AUTO-ENTMCNC: 30.3 G/DL (ref 32–36)
MCV RBC AUTO: 91 FL (ref 80–100)
MONOCYTES # BLD AUTO: 0.42 X10*3/UL (ref 0.05–0.8)
MONOCYTES NFR BLD AUTO: 6.1 %
NEUTROPHILS # BLD AUTO: 4.01 X10*3/UL (ref 1.6–5.5)
NEUTROPHILS NFR BLD AUTO: 58.7 %
NRBC BLD-RTO: 0 /100 WBCS (ref 0–0)
PLATELET # BLD AUTO: 312 X10*3/UL (ref 150–450)
PTH-INTACT SERPL-MCNC: 84.1 PG/ML (ref 18.5–88)
RBC # BLD AUTO: 4.29 X10*6/UL (ref 4–5.2)
TIBC SERPL-MCNC: 476 UG/DL (ref 240–445)
UIBC SERPL-MCNC: 443 UG/DL (ref 110–370)
VIT B12 SERPL-MCNC: 1891 PG/ML (ref 211–911)
WBC # BLD AUTO: 6.8 X10*3/UL (ref 4.4–11.3)

## 2025-01-15 PROCEDURE — 82607 VITAMIN B-12: CPT

## 2025-01-15 PROCEDURE — 83970 ASSAY OF PARATHORMONE: CPT

## 2025-01-15 PROCEDURE — 82728 ASSAY OF FERRITIN: CPT

## 2025-01-15 PROCEDURE — 83540 ASSAY OF IRON: CPT

## 2025-01-15 PROCEDURE — 83550 IRON BINDING TEST: CPT

## 2025-01-15 PROCEDURE — 85025 COMPLETE CBC W/AUTO DIFF WBC: CPT

## 2025-01-17 NOTE — PROGRESS NOTES
Subjective   Patient ID: Oxana Rao is a 74 y.o. female who presents for No chief complaint on file..  HPI  6 mos fuv from previous b 12/ review labs  START WT: 233   IDEAL WT:158 START EXCESS: 75  HT: 66.75  IN.  LABS DONE IN EPIC  B12 injection administered by sd to rt deltoid  Review of Systems  CONSTITUTIONAL:          Chills No.  Fatigue yes.  Fever No.    admits to headache     CARDIOLOGY:          Negative for dizziness, chest pain, palpitations, shortness of breath.         RESPIRATORY:          Sleep Apnea admits to insomnia  Negative for chest congestion, cough, wheezing.         GASTROENTEROLOGY:          Food Intolerance No.  Abdominal pain No.  Acid reflux No.  Black stools No.  Constipation No.  Diarrhea No.  Loss of appetite no.  Nausea No.  Vomiting No.         UROLOGY:          Negative for dysuria, urinary urgency, kidney stones.         PSYCHOLOGY:          Negative for depression, anxiety, high stress level.    Objective   Physical Exam    Assessment/Plan            Shyanne Luis LPN 01/17/25 8:35 AM

## 2025-01-24 ENCOUNTER — APPOINTMENT (OUTPATIENT)
Dept: SURGERY | Facility: CLINIC | Age: 75
End: 2025-01-24
Payer: MEDICARE

## 2025-01-24 VITALS
SYSTOLIC BLOOD PRESSURE: 138 MMHG | HEIGHT: 67 IN | WEIGHT: 194 LBS | HEART RATE: 86 BPM | BODY MASS INDEX: 30.45 KG/M2 | DIASTOLIC BLOOD PRESSURE: 89 MMHG

## 2025-01-24 DIAGNOSIS — E21.3 HYPERPARATHYROIDISM (MULTI): ICD-10-CM

## 2025-01-24 DIAGNOSIS — E53.8 VITAMIN B12 DEFICIENCY: ICD-10-CM

## 2025-01-24 DIAGNOSIS — K90.9 INTESTINAL MALABSORPTION, UNSPECIFIED TYPE (HHS-HCC): Primary | ICD-10-CM

## 2025-01-24 DIAGNOSIS — E61.1 IRON DEFICIENCY: ICD-10-CM

## 2025-01-24 DIAGNOSIS — E55.9 VITAMIN D DEFICIENCY: ICD-10-CM

## 2025-01-24 PROCEDURE — 96372 THER/PROPH/DIAG INJ SC/IM: CPT

## 2025-01-24 RX ORDER — FAMOTIDINE 10 MG/ML
20 INJECTION INTRAVENOUS ONCE AS NEEDED
OUTPATIENT
Start: 2025-01-24

## 2025-01-24 RX ORDER — SEMAGLUTIDE 0.25 MG/.5ML
0.25 INJECTION, SOLUTION SUBCUTANEOUS WEEKLY
Qty: 2 ML | Refills: 1 | Status: SHIPPED | OUTPATIENT
Start: 2025-01-24

## 2025-01-24 RX ORDER — ALBUTEROL SULFATE 0.83 MG/ML
3 SOLUTION RESPIRATORY (INHALATION) AS NEEDED
OUTPATIENT
Start: 2025-01-24

## 2025-01-24 RX ORDER — DIPHENHYDRAMINE HYDROCHLORIDE 50 MG/ML
50 INJECTION INTRAMUSCULAR; INTRAVENOUS AS NEEDED
OUTPATIENT
Start: 2025-01-24

## 2025-01-24 RX ORDER — CYANOCOBALAMIN 1000 UG/ML
1000 INJECTION, SOLUTION INTRAMUSCULAR; SUBCUTANEOUS ONCE
Status: COMPLETED | OUTPATIENT
Start: 2025-01-24 | End: 2025-01-24

## 2025-01-24 RX ORDER — EPINEPHRINE 0.3 MG/.3ML
0.3 INJECTION SUBCUTANEOUS EVERY 5 MIN PRN
OUTPATIENT
Start: 2025-01-24

## 2025-01-24 RX ADMIN — CYANOCOBALAMIN 1000 MCG: 1000 INJECTION, SOLUTION INTRAMUSCULAR; SUBCUTANEOUS at 11:05

## 2025-01-24 ASSESSMENT — PAIN SCALES - GENERAL: PAINLEVEL_OUTOF10: 5

## 2025-01-24 NOTE — PROGRESS NOTES
Subjective   Patient ID: Oxana Rao is a 74 y.o. female who presents for No chief complaint on file..  HPI  Oxana is here for lab follow up. She has lost 7 pounds since her last appointment. She tells me she is feeling good, but a little fatigued.  She is currently taking a  MVI, zinc, B12, D3, D4, magnesium, calcium once daily. She tells me that her insurance will cover Wegovy and would like to start.     Objective   Physical Exam  Constitutional:       Appearance: Normal appearance.   Eyes:      Pupils: Pupils are equal, round, and reactive to light.   Cardiovascular:      Rate and Rhythm: Normal rate.   Pulmonary:      Effort: Pulmonary effort is normal.   Abdominal:      Palpations: Abdomen is soft.   Musculoskeletal:         General: Normal range of motion.   Skin:     General: Skin is warm and dry.   Neurological:      General: No focal deficit present.      Mental Status: She is alert and oriented to person, place, and time.   Psychiatric:         Mood and Affect: Mood normal.         Assessment/Plan   Problem List Items Addressed This Visit             ICD-10-CM    Hyperparathyroidism (Multi) E21.3    Intestinal malabsorption - Primary K90.9    Relevant Orders    CBC and Auto Differential    Ferritin    Iron and TIBC    Vitamin D deficiency E55.9    Vitamin B12 deficiency E53.8    Iron deficiency E61.1    Relevant Orders    CBC and Auto Differential    Ferritin    Iron and TIBC    BMI 30.0-30.9,adult Z68.30    Relevant Medications    semaglutide, weight loss, (Wegovy) 0.25 mg/0.5 mL pen injector     Oxana and I discussed again the proper supplementation after bariatric surgery. I asked her again to start an iron supplement, as her MVI does not have the recommended iron amount. She will also schedule iron infusions. We discussed that I was not sure if her insurance would cover Wegovy, but I would send in her prescription. If the cost was acceptable, and she started to take it, then she would follow up  in 1 month to assess how it was working. She will follow up in 6 months with repeat labwork.     Lilia Manning, CORA-CNP 01/24/25 8:58 AM

## 2025-01-27 ENCOUNTER — DOCUMENTATION (OUTPATIENT)
Dept: INFUSION THERAPY | Facility: CLINIC | Age: 75
End: 2025-01-27
Payer: MEDICARE

## 2025-01-27 NOTE — PROGRESS NOTES
CLINICAL CLEARANCE FOR OUTPATIENT INFUSION      Patient to be scheduled for Feraheme (Ferumoxytol) infusions.     For Diagnosis: Iron Deficiency Anemia    Urine Hcg test ordered prior to first infusion?  Not applicable    Review of Labs:  Lab Results   Component Value Date    HGB 11.8 (L) 01/15/2025    FERRITIN 20 01/15/2025    IRON 33 (L) 01/15/2025    TIBC 476 (H) 01/15/2025    MCHC 30.3 (L) 01/15/2025    MCV 91 01/15/2025    MCH 27.5 01/15/2025      Lab Results   Component Value Date    IRONSAT 7 (L) 01/15/2025        Do labs confirm diagnosis of iron deficiency? Yes          Okay to schedule for infusion as ordered by prescribing provider.      CORA Hernandez-CNP    [facility])  [id number] [place] [address] A, [place] [address]  Phone: [phone]

## 2025-02-03 ENCOUNTER — TELEPHONE (OUTPATIENT)
Dept: SURGERY | Facility: CLINIC | Age: 75
End: 2025-02-03
Payer: MEDICARE

## 2025-02-03 ENCOUNTER — TELEPHONE (OUTPATIENT)
Dept: PRIMARY CARE | Facility: CLINIC | Age: 75
End: 2025-02-03
Payer: MEDICARE

## 2025-02-03 NOTE — TELEPHONE ENCOUNTER
PT came into office to drop off paperwork for completion for plasma donation. Form placed in folder at . PT requesting a call when completed, OK to leave message on voicemail as she may not be able to answer. [phone]

## 2025-02-03 NOTE — TELEPHONE ENCOUNTER
SPOKE WITH PATIENT REGARDING IRON INFUSION/ SHE STATES THAT NO ONE HAS REACHED OUT TO HER/CALL TRANSFERRED TO THE NP.

## 2025-02-03 NOTE — TELEPHONE ENCOUNTER
cancelled appt that was scheduled Feb 24, 2025 per np/ lvm for the patient.   Lvm that we will no longer be doing B 12's in the office, and the np said that the patient is following up for medications with their pcp. Offered patient if she needs to call back to call the office.  The office number was provided.

## 2025-02-11 ENCOUNTER — TELEPHONE (OUTPATIENT)
Dept: PRIMARY CARE | Facility: CLINIC | Age: 75
End: 2025-02-11
Payer: MEDICARE

## 2025-02-11 NOTE — TELEPHONE ENCOUNTER
She doesn't have renal failure. She has chronic kidney disease which is anything with kidney function less that 60. Hers was 48, in May 2024. Causes include high blood pressure, DM, etc. The goal is to keep her kidney function stable.

## 2025-02-11 NOTE — TELEPHONE ENCOUNTER
Pt lvm requesting a call back stating she has a medical questions states she received a call but she is unsure what was meant in the message

## 2025-02-11 NOTE — TELEPHONE ENCOUNTER
Call placed to pt stating she has been doing plasma stating she has been doing this for years. Pt stating she was asked about having renal failure. Stating this was mentioned on the form that was filled out here. Pt stating she had no knowledge of this pt wanting to know if this is accurate    Please advise

## 2025-02-12 ENCOUNTER — OFFICE VISIT (OUTPATIENT)
Dept: PRIMARY CARE | Facility: CLINIC | Age: 75
End: 2025-02-12
Payer: MEDICARE

## 2025-02-12 VITALS
BODY MASS INDEX: 31.69 KG/M2 | DIASTOLIC BLOOD PRESSURE: 72 MMHG | OXYGEN SATURATION: 99 % | WEIGHT: 197.2 LBS | HEIGHT: 66 IN | SYSTOLIC BLOOD PRESSURE: 122 MMHG | HEART RATE: 62 BPM | TEMPERATURE: 96.2 F

## 2025-02-12 DIAGNOSIS — Z12.31 BREAST CANCER SCREENING BY MAMMOGRAM: ICD-10-CM

## 2025-02-12 DIAGNOSIS — I10 ESSENTIAL HYPERTENSION: ICD-10-CM

## 2025-02-12 DIAGNOSIS — Z00.00 ADULT GENERAL MEDICAL EXAM: Primary | ICD-10-CM

## 2025-02-12 DIAGNOSIS — N18.31 CHRONIC KIDNEY DISEASE, STAGE 3A (MULTI): ICD-10-CM

## 2025-02-12 DIAGNOSIS — Z12.11 COLON CANCER SCREENING: ICD-10-CM

## 2025-02-12 DIAGNOSIS — E78.2 MIXED HYPERLIPIDEMIA: ICD-10-CM

## 2025-02-12 PROBLEM — I50.9 HEART FAILURE: Status: RESOLVED | Noted: 2023-09-27 | Resolved: 2025-02-12

## 2025-02-12 PROCEDURE — 1159F MED LIST DOCD IN RCRD: CPT | Performed by: INTERNAL MEDICINE

## 2025-02-12 PROCEDURE — 99215 OFFICE O/P EST HI 40 MIN: CPT | Performed by: INTERNAL MEDICINE

## 2025-02-12 PROCEDURE — G0439 PPPS, SUBSEQ VISIT: HCPCS | Performed by: INTERNAL MEDICINE

## 2025-02-12 PROCEDURE — 3078F DIAST BP <80 MM HG: CPT | Performed by: INTERNAL MEDICINE

## 2025-02-12 PROCEDURE — 99397 PER PM REEVAL EST PAT 65+ YR: CPT | Mod: 25 | Performed by: INTERNAL MEDICINE

## 2025-02-12 PROCEDURE — 3008F BODY MASS INDEX DOCD: CPT | Performed by: INTERNAL MEDICINE

## 2025-02-12 PROCEDURE — 1036F TOBACCO NON-USER: CPT | Performed by: INTERNAL MEDICINE

## 2025-02-12 PROCEDURE — 99397 PER PM REEVAL EST PAT 65+ YR: CPT | Performed by: INTERNAL MEDICINE

## 2025-02-12 PROCEDURE — 3074F SYST BP LT 130 MM HG: CPT | Performed by: INTERNAL MEDICINE

## 2025-02-12 PROCEDURE — 1126F AMNT PAIN NOTED NONE PRSNT: CPT | Performed by: INTERNAL MEDICINE

## 2025-02-12 ASSESSMENT — PAIN SCALES - GENERAL: PAINLEVEL_OUTOF10: 0-NO PAIN

## 2025-02-12 NOTE — PROGRESS NOTES
Outpatient Visit Note    Chief Complaint   Patient presents with    Annual Exam     Medicare wellness visit       HPI:  Oxana Rao is a 74 y.o. female here  for a Medicare Wellness Visit.    Health Maintenance    Denies smoking or illicit drug use, drinks occasional alcoholic beverages a week. Patient reports routine vision checks and dental cleanings, and regular exercise.    Screening colonoscopy done in 6/2012. Screening pap N/A. Screening mammogram done 2/2024. DEXA done 7/2024.     Hearing screen: reports no difficulty with hearing    Does the patient use opioid medications:  No    How does the patient rate their health status today: good    Cognitive Screen:  AAAx3  to person, place and time: Yes  3 word recall: Apple, sissy, table   - Immediate recall: Yes    - 5 minutes recall: Yes   Impression: No cognitive deficiency observed during screening or encounter today    Reviewed:   Past Medical History/Allergies:  Yes  Family History:  Yes  Social History:  Yes  Current Medications:  Yes  Vital Signs:  Yes  Immunizations:  reviewed today  Home Safety:                    Up & Go test > 30 seconds?  No                   Home have rugs; lack grab bars in bathroom; lack handrail on stairs; have poor lighting?  No                   Hearing difficulties?  No  Geriatric Assessment                   ADL areas requiring assistance:  Does not need help with Dressing, Eating, Ambulating, Toileting, Grooming, Hygiene.                    IADL areas requiring assistance:  Does not need help with Shopping, Housework, Accounting, Transportation, Driving.   Medications: reviewed  Current supplements:  Reviewed and recorded.   Other providers: Reviewed and recorded    Patient Health Questionnaire-2 Score: 0 (2/12/2025  8:14 AM)          Past Medical History:   Diagnosis Date    Asthma     CHF (congestive heart failure)     Chronic bronchitis (Multi)     Diverticulitis     GERD (gastroesophageal reflux disease)      HTN (hypertension)     Intestinal malabsorption     Sleep apnea         Current Medications  Current Outpatient Medications   Medication Instructions    albuterol (ProAir HFA) 90 mcg/actuation inhaler 2 puffs, inhalation, Every 4 hours PRN    CALCIUM ORAL 2 times a day.    cholestyramine (Questran) 4 gram packet     cyanocobalamin (VITAMIN B-12) 1,000 mcg, Every 30 days    diphenhydrAMINE (BENADRYL) 25 mg, oral, Every 6 hours PRN    ergocalciferol (Vitamin D-2) 1.25 MG (38536 UT) capsule TAKE 1 CAPSULE BY MOUTH ONE TIME PER WEEK FOR 90 DAYS    fluticasone (Flonase) 50 mcg/actuation nasal spray 1 spray, Each Nostril, Daily, Shake gently. Before first use, prime pump. After use, clean tip and replace cap.    gabapentin (NEURONTIN) 300 mg, 3 times daily    hydroCHLOROthiazide (HYDRODIURIL) 25 mg, oral, Daily    multivitamin (multivitamin with folic acid) tablet Multiple Vitamins Oral Tablet   Refills: 0        Start : 8-Jan-2016   Active    omeprazole (PriLOSEC) 40 mg DR capsule TAKE 1 CAPSULE BY MOUTH 30 MINUTES BEFORE MORNING MEAL EVERY DAY FOR 90 DAYS    triamcinolone (Kenalog) 0.1 % ointment PLEASE SEE ATTACHED FOR DETAILED DIRECTIONS    Wegovy 0.25 mg, subcutaneous, Weekly        Allergies  Allergies   Allergen Reactions    Lactose Other     GAS    Nsaids (Non-Steroidal Anti-Inflammatory Drug) Other     Gastric Bypass    Shrimp Swelling     Could not swallow the morning after she ate shrimp    Latex, Natural Rubber Itching and Other     itching of skin/ skin irritation    Ciprofloxacin GI Upset and Other     Patient reports previous tendon soreness in achilles while on cipro    Tramadol Headache        Immunizations  Immunization History   Administered Date(s) Administered    Flu vaccine (IIV4), preservative free *Check age/dose* 10/23/2017    Flu vaccine, quadrivalent, high-dose, preservative free, age 65y+ (FLUZONE) 10/06/2020    Flu vaccine, quadrivalent, recombinant, preservative free, adult (FLUBLOK)  12/31/2022    Flu vaccine, trivalent, preservative free, HIGH-DOSE, age 65y+ (Fluzone) 10/01/2017, 09/05/2024    Influenza, Seasonal, Quadrivalent, Adjuvanted 10/14/2021    Influenza, seasonal, injectable 12/09/2014    Moderna COVID-19 vaccine, 12 years and older (50mcg/0.5mL)(Spikevax) 01/23/2024    Moderna SARS-CoV-2 Vaccination 03/05/2021, 04/09/2021, 12/16/2021    PPD Test 09/03/2024, 09/10/2024    Pfizer COVID-19 vaccine, 12 years and older, (30mcg/0.3mL) (Comirnaty) 10/26/2023    Pfizer COVID-19 vaccine, bivalent, age 12 years and older (30 mcg/0.3 mL) 02/27/2023    Pneumococcal polysaccharide vaccine, 23-valent, age 2 years and older (PNEUMOVAX 23) 12/12/2017    RSV, 60 Years And Older (AREXVY) 01/23/2024    Zoster vaccine, recombinant, adult (SHINGRIX) 01/23/2024        Past Surgical History:   Procedure Laterality Date    APPENDECTOMY  01/08/2016    Appendectomy    APPENDECTOMY      BUNIONECTOMY      HERNIA REPAIR  01/08/2016    Hernia Repair    HIATAL HERNIA REPAIR      HYSTERECTOMY  01/08/2016    Hysterectomy    HYSTERECTOMY      INCONTINENCE SURGERY      KNEE      total knee replacement    OTHER SURGICAL HISTORY  01/08/2016    Bunion Correction With Metatarsal Osteotomy    OTHER SURGICAL HISTORY  01/08/2016    Wrist Arthroplasty    OTHER SURGICAL HISTORY  05/18/2022    Urethropexy    SHOULDER SURGERY  01/08/2016    Shoulder Surgery    TOTAL KNEE ARTHROPLASTY  01/08/2016    Knee Replacement     Family History   Problem Relation Name Age of Onset    Heart attack Mother      Heart disease Mother      Hypertension Mother      Stroke Mother      Asthma Mother      Hypertension Sister      Heart attack Brother      Other (lung/prostate/breast) Brother      Heart disease Brother      Hypertension Brother      Stroke Brother      Asthma Brother      Cancer Brother      Cancer Brother      Asthma Daughter       Social History     Tobacco Use    Smoking status: Never     Passive exposure: Never    Smokeless  tobacco: Never   Vaping Use    Vaping status: Never Used   Substance Use Topics    Alcohol use: Yes     Comment: SOCIALLY    Drug use: Never     Tobacco Use: Low Risk  (2/12/2025)    Patient History     Smoking Tobacco Use: Never     Smokeless Tobacco Use: Never     Passive Exposure: Never        ROS  All pertinent positive symptoms are included in the history of present illness.  All other systems have been reviewed and are negative and noncontributory to this patient's current ailments.    VITAL SIGNS  Vitals:    02/12/25 0809   BP: 122/72   Pulse: 62   Temp: 35.7 °C (96.2 °F)   SpO2: 99%     Vitals:    02/12/25 0809   Weight: 89.4 kg (197 lb 3.2 oz)      Body mass index is 31.83 kg/m².     PHYSICAL EXAM  GENERAL APPEARANCE: well nourished, well developed, looks like stated age, in no acute distress, not ill or tired appearing, conversing well.   HEENT: no trauma, normocephalic. PERRLA and EOMI with normal external exam. TM's intact with no injection or effusion, no signs of infection. Nares patent, turbinates pink without discharge. Pharynx pink with no exudates or lesions, no enlarged tonsils.   NECK: no nodes, supple without rigidity, no neck mass was observed, no thyromegaly or thyroid nodules.   HEART: regular rate and rhythm, S1 and S2 heard with no murmurs or skipped beats, no carotid bruits.   LUNGS: clear to auscultation bilaterally with no wheezes, crackles or rales.   ABDOMEN: no organomegaly, soft, nontender, nondistended, normal bowel sounds, no guarding/rebound/rigidity.   EXTREMITIES: moving all extremities equally with no edema or deformities.   SKIN: normal skin color and pigmentation, normal skin turgor without rash, lesions, or nodules visualized.   NEUROLOGIC EXAM: CN II-XII grossly intact, normal gait, normal balance, 5/5 muscle strength, sensation grossly intact.   PSYCH: mood and affect appropriate; alert and oriented to time, place, person; no difficulty with speech or language.   LYMPH  NODES: no cervical lymphadenopathy.             Assessment/Plan   Diagnoses and all orders for this visit:  Adult general medical exam  -     Urinalysis with Reflex Culture and Microscopic; Future  Chronic kidney disease, stage 3a (Multi)  -     Albumin-Creatinine Ratio, Urine Random; Future  Essential hypertension  -     Comprehensive Metabolic Panel; Future  -     CBC; Future  -     TSH with reflex to Free T4 if abnormal; Future  Mixed hyperlipidemia  -     Lipid Panel; Future  Breast cancer screening by mammogram  -     BI mammo bilateral screening tomosynthesis; Future  Colon cancer screening  -     Colonoscopy Screening; Average Risk Patient; Future          This was a shared decision making visit.    Next Wellness Exam Due  In 1 year from today      Michael Gill MD   02/12/25   8:17 AM

## 2025-02-12 NOTE — TELEPHONE ENCOUNTER
Patient informed of the below message. [facility] and see if dr graham would complete this. Will watch for form to come through.

## 2025-02-17 ENCOUNTER — APPOINTMENT (OUTPATIENT)
Dept: INFUSION THERAPY | Facility: CLINIC | Age: 75
End: 2025-02-17
Payer: MEDICARE

## 2025-02-17 VITALS
RESPIRATION RATE: 16 BRPM | DIASTOLIC BLOOD PRESSURE: 94 MMHG | SYSTOLIC BLOOD PRESSURE: 168 MMHG | OXYGEN SATURATION: 97 % | TEMPERATURE: 97.4 F | HEART RATE: 67 BPM

## 2025-02-17 DIAGNOSIS — E61.1 IRON DEFICIENCY: ICD-10-CM

## 2025-02-17 PROCEDURE — 96365 THER/PROPH/DIAG IV INF INIT: CPT | Performed by: NURSE PRACTITIONER

## 2025-02-17 RX ORDER — EPINEPHRINE 0.3 MG/.3ML
0.3 INJECTION SUBCUTANEOUS EVERY 5 MIN PRN
OUTPATIENT
Start: 2025-02-24

## 2025-02-17 RX ORDER — ALBUTEROL SULFATE 0.83 MG/ML
3 SOLUTION RESPIRATORY (INHALATION) AS NEEDED
OUTPATIENT
Start: 2025-02-24

## 2025-02-17 RX ORDER — DIPHENHYDRAMINE HYDROCHLORIDE 50 MG/ML
50 INJECTION INTRAMUSCULAR; INTRAVENOUS AS NEEDED
OUTPATIENT
Start: 2025-02-24

## 2025-02-17 RX ORDER — FAMOTIDINE 10 MG/ML
20 INJECTION, SOLUTION INTRAVENOUS ONCE AS NEEDED
OUTPATIENT
Start: 2025-02-24

## 2025-02-17 ASSESSMENT — ENCOUNTER SYMPTOMS
DIZZINESS: 0
HEADACHES: 1
CONSTIPATION: 0
PALPITATIONS: 0
LEG SWELLING: 0
NERVOUS/ANXIOUS: 0
ARTHRALGIAS: 0
HEMATURIA: 0
SHORTNESS OF BREATH: 0
DYSURIA: 0
VOMITING: 0
WOUND: 0
FREQUENCY: 0
COUGH: 0
UNEXPECTED WEIGHT CHANGE: 0
NUMBNESS: 0
VOICE CHANGE: 0
CHILLS: 0
WHEEZING: 0
FEVER: 0
DIARRHEA: 0
ABDOMINAL PAIN: 0
FATIGUE: 0
NAUSEA: 0
EYE PROBLEMS: 0
TROUBLE SWALLOWING: 0
DEPRESSION: 0
APPETITE CHANGE: 0
LIGHT-HEADEDNESS: 0
MYALGIAS: 0
SORE THROAT: 0
BLOOD IN STOOL: 0
EXTREMITY WEAKNESS: 0

## 2025-02-17 NOTE — PATIENT INSTRUCTIONS
Today :We administered ferumoxytol (Feraheme) 510 mg in sodium chloride 0.9% 117 mL IV.     For:   1. Iron deficiency         Your next appointment is due in:  ONE WEEK          Please read the  Medication Guide that was given to you and reviewed during todays visit.     (Tell all doctors including dentists that you are taking this medication)     Go to the emergency room or call 911 if:  -You have signs of allergic reaction:   -Rash, hives, itching.   -Swollen, blistered, peeling skin.   -Swelling of face, lips, mouth, tongue or throat.   -Tightness of chest, trouble breathing, swallowing or talking     Call your doctor:  - If IV / injection site gets red, warm, swollen, itchy or leaks fluid or pus.     (Leave dressing on your IV site for at least 2 hours and keep area clean and dry  - If you get sick or have symptoms of infection or are not feeling well for any reason.    (Wash your hands often, stay away from people who are sick)  - If you have side effects from your medication that do not go away or are bothersome.     (Refer to the teaching your nurse gave you for side effects to call your doctor about)    - Common side effects may include:  stuffy nose, headache, feeling tired, muscle aches, upset stomach  - Before receiving any vaccines     - Call [facility] at   If:  - You get sick, are on antibiotics, have had a recent vaccine, have surgery or dental work and your doctor wants your visit rescheduled.  - You need to cancel and reschedule your visit for any reason. Call at least 2 days before your visit if you need to cancel.   - Your insurance changes before your next visit.    (We will need to get approval from your new insurance. This can take up to two weeks.)     The [facility] is opened Monday thru Friday. We are closed on weekends and holidays.   Voice mail will take your call if the center is closed. If you leave a message please allow 24 hours for a call back  during weekdays. If you leave a message on a weekend/holiday, we will call you back the next business day.    A pharmacist is available Monday - Friday from 8:30AM to 3:30PM to help answer any questions you may have about your prescriptions(s). Please call pharmacy at:    [facility]: [phone]  [facility]: [phone]  [facility]: [phone]

## 2025-02-20 ENCOUNTER — TELEPHONE (OUTPATIENT)
Dept: PRIMARY CARE | Facility: CLINIC | Age: 75
End: 2025-02-20
Payer: MEDICARE

## 2025-02-20 NOTE — TELEPHONE ENCOUNTER
"Patient states she wanted to speak with PCP about \"something personal\" she asked to be transferred to [facility] but I advised her we do not have those for Physicians. She asked I relay message to PCP to ask her to call her when available. She said it is personal not medical related - did not give me any other information  "

## 2025-02-24 ENCOUNTER — APPOINTMENT (OUTPATIENT)
Dept: SURGERY | Facility: CLINIC | Age: 75
End: 2025-02-24
Payer: MEDICARE

## 2025-02-24 ENCOUNTER — INFUSION (OUTPATIENT)
Dept: INFUSION THERAPY | Facility: CLINIC | Age: 75
End: 2025-02-24
Payer: MEDICARE

## 2025-02-24 VITALS
TEMPERATURE: 96.4 F | BODY MASS INDEX: 31.9 KG/M2 | HEART RATE: 76 BPM | DIASTOLIC BLOOD PRESSURE: 89 MMHG | RESPIRATION RATE: 16 BRPM | WEIGHT: 197.64 LBS | OXYGEN SATURATION: 99 % | SYSTOLIC BLOOD PRESSURE: 136 MMHG

## 2025-02-24 DIAGNOSIS — E61.1 IRON DEFICIENCY: ICD-10-CM

## 2025-02-24 PROCEDURE — 96365 THER/PROPH/DIAG IV INF INIT: CPT | Performed by: NURSE PRACTITIONER

## 2025-02-24 RX ORDER — EPINEPHRINE 0.3 MG/.3ML
0.3 INJECTION SUBCUTANEOUS EVERY 5 MIN PRN
Status: CANCELLED | OUTPATIENT
Start: 2025-02-24

## 2025-02-24 RX ORDER — FAMOTIDINE 10 MG/ML
20 INJECTION, SOLUTION INTRAVENOUS ONCE AS NEEDED
Status: CANCELLED | OUTPATIENT
Start: 2025-02-24

## 2025-02-24 RX ORDER — ALBUTEROL SULFATE 0.83 MG/ML
3 SOLUTION RESPIRATORY (INHALATION) AS NEEDED
Status: CANCELLED | OUTPATIENT
Start: 2025-02-24

## 2025-02-24 RX ORDER — DIPHENHYDRAMINE HYDROCHLORIDE 50 MG/ML
50 INJECTION INTRAMUSCULAR; INTRAVENOUS AS NEEDED
Status: CANCELLED | OUTPATIENT
Start: 2025-02-24

## 2025-02-24 ASSESSMENT — ENCOUNTER SYMPTOMS
ARTHRALGIAS: 0
CHILLS: 0
HEADACHES: 0
FATIGUE: 0
COUGH: 0
SORE THROAT: 0
MYALGIAS: 0
EXTREMITY WEAKNESS: 0
SHORTNESS OF BREATH: 0
LEG SWELLING: 0
NUMBNESS: 0
DYSURIA: 0
ABDOMINAL PAIN: 0
DIZZINESS: 0
DEPRESSION: 0
APPETITE CHANGE: 0
WHEEZING: 0
LIGHT-HEADEDNESS: 0
FREQUENCY: 0
FEVER: 0
HEMATURIA: 0
VOMITING: 0
CONSTIPATION: 0
TROUBLE SWALLOWING: 0
VOICE CHANGE: 0
BLOOD IN STOOL: 0
PALPITATIONS: 0
DIARRHEA: 0
NAUSEA: 0
WOUND: 0
EYE PROBLEMS: 0
UNEXPECTED WEIGHT CHANGE: 0
NERVOUS/ANXIOUS: 0

## 2025-02-24 NOTE — PROGRESS NOTES
[facility]   Infusion Clinic Note   Date: 2025   Name: Oxana Rao  : 1950   MRN: [id number]         Reason for Visit: OP Infusion (PT HERE FOR DOSE 2 OUT OF 2 OF FERAHEME 510 MG INFUSION)         Today: We administered ferumoxytol (Feraheme) 510 mg in sodium chloride 0.9% 117 mL IV.       Ordered By: Lilia Manning A*       For a Diagnosis of: Iron deficiency       At today's visit patient accompanied by: Self      Vitals:   Vitals:    25 0727 25 0811 25 0835   BP: 135/81 103/67 136/89   Pulse: 88 82 76   Resp: 18 16 16   Temp: 36.4 °C (97.6 °F) 36.4 °C (97.5 °F) 35.8 °C (96.4 °F)   SpO2: 98% 98% 99%   Weight: 89.7 kg (197 lb 10.3 oz)               Pre - Treatment Checklist:      - Previous reaction to current treatment: no      Assess patient for the concerns below. Document provider notification as appropriate.  - Active or recent infection with/without current antibiotic use: no  - Recent or planned invasive dental work: no  - Recent or planned surgeries: no  - Recently received or plans to receive vaccinations: no  - Has treatment related toxicities: no  - Any chance may be pregnant:  n/a      Pain: 0   - Is the pain different from normal: n/a   - Is prescribing Doctor aware:  n/a      Labs: N/A      Fall Risk Screening: Carroll Fall Risk  History of Falling, Immediate or Within 3 Months: No  Secondary Diagnosis: No  Ambulatory Aid: Walks without aid/bedrest/nurse assist  Intravenous Therapy/Heparin Lock: Yes  Gait/Transferring: Normal/bedrest/immobile  Mental Status: Oriented to own ability  Carroll Fall Risk Score: 20       Review Of Systems:  Review of Systems   Constitutional:  Negative for appetite change, chills, fatigue, fever and unexpected weight change.   HENT:   Positive for tinnitus. Negative for hearing loss, mouth sores, sore throat, trouble swallowing and voice change.         PT ADMITS TO TINNITUS AT BASELINE   Eyes:  Negative  for eye problems.        WEARS GLASSES   Respiratory:  Negative for cough, shortness of breath and wheezing.    Cardiovascular:  Negative for chest pain, leg swelling and palpitations.   Gastrointestinal:  Negative for abdominal pain, blood in stool, constipation, diarrhea, nausea and vomiting.   Genitourinary:  Negative for dysuria, frequency and hematuria.    Musculoskeletal:  Negative for arthralgias and myalgias.   Skin:  Negative for itching, rash and wound.   Neurological:  Negative for dizziness, extremity weakness, headaches, light-headedness and numbness.   Psychiatric/Behavioral:  Negative for depression. The patient is not nervous/anxious.          Infusion Readiness:  - Assessment Concerns Related to Infusion: No  - Provider notified: n/a      New Patient Education:    N/A (returning patient for continuation of therapy. Ongoing education provided as needed.)        Treatment Conditions & Drug Specific Questions:    Ferumoxytol  (FERAHEME)    (Unless otherwise specified on patient specific therapy plan):    REMINDERS:  May cause hypotension, patient should be in a reclined or semi-reclined position during infusion.    Ferumoxytol can interfere with MR imaging and Radiology should be notified if a patient has received ferumoxytol within 3 months of the anticipated MR.    Recommended Vitals/Observation:  Vitals: Obtain vital signs before, immediately following infusion, and 30 minutes after completion of infusion.  Observation: 30 minutes after each infusion. Observation complete.      Lab Results   Component Value Date    IRON 33 (L) 01/15/2025    TIBC 476 (H) 01/15/2025    FERRITIN 20 01/15/2025      Lab Results   Component Value Date    IRONSAT 7 (L) 01/15/2025      Lab Results   Component Value Date    WBC 6.8 01/15/2025    HGB 11.8 (L) 01/15/2025    HCT 38.9 01/15/2025    MCV 91 01/15/2025     01/15/2025            Weight Based Drug Calculations:    WEIGHT BASED DRUGS: NOT APPLICABLE / FLAT DOSE        Post Treatment: Patient tolerated treatment without issue and was discharged in no apparent distress.      Note Authored / Patient Cared for By: Nichole Crandall RN

## 2025-02-25 ENCOUNTER — TELEPHONE (OUTPATIENT)
Dept: UROLOGY | Facility: CLINIC | Age: 75
End: 2025-02-25
Payer: MEDICARE

## 2025-03-17 ENCOUNTER — TELEPHONE (OUTPATIENT)
Dept: UROLOGY | Facility: CLINIC | Age: 75
End: 2025-03-17
Payer: MEDICARE

## 2025-03-17 NOTE — TELEPHONE ENCOUNTER
Attempted to schedule patient for Dr. Davis's [employer] clinic LVM for patient to return call at her soonest convenience.

## 2025-03-20 LAB
ALBUMIN SERPL-MCNC: 3.9 G/DL (ref 3.6–5.1)
ALP SERPL-CCNC: 81 U/L (ref 37–153)
ALT SERPL-CCNC: 23 U/L (ref 6–29)
ANION GAP SERPL CALCULATED.4IONS-SCNC: 10 MMOL/L (CALC) (ref 7–17)
AST SERPL-CCNC: 38 U/L (ref 10–35)
BILIRUB SERPL-MCNC: 0.3 MG/DL (ref 0.2–1.2)
BUN SERPL-MCNC: 12 MG/DL (ref 7–25)
CALCIUM SERPL-MCNC: 8.8 MG/DL (ref 8.6–10.4)
CHLORIDE SERPL-SCNC: 106 MMOL/L (ref 98–110)
CHOLEST SERPL-MCNC: 192 MG/DL
CHOLEST/HDLC SERPL: 2.3 (CALC)
CO2 SERPL-SCNC: 24 MMOL/L (ref 20–32)
CREAT SERPL-MCNC: 0.83 MG/DL (ref 0.6–1)
EGFRCR SERPLBLD CKD-EPI 2021: 74 ML/MIN/1.73M2
ERYTHROCYTE [DISTWIDTH] IN BLOOD BY AUTOMATED COUNT: 15.5 % (ref 11–15)
GLUCOSE SERPL-MCNC: 91 MG/DL (ref 65–99)
HCT VFR BLD AUTO: 39.5 % (ref 35–45)
HDLC SERPL-MCNC: 83 MG/DL
HGB BLD-MCNC: 12.3 G/DL (ref 11.7–15.5)
LDLC SERPL CALC-MCNC: 93 MG/DL (CALC)
MCH RBC QN AUTO: 27.5 PG (ref 27–33)
MCHC RBC AUTO-ENTMCNC: 31.1 G/DL (ref 32–36)
MCV RBC AUTO: 88.2 FL (ref 80–100)
NONHDLC SERPL-MCNC: 109 MG/DL (CALC)
PLATELET # BLD AUTO: 318 THOUSAND/UL (ref 140–400)
PMV BLD REES-ECKER: 10.9 FL (ref 7.5–12.5)
POTASSIUM SERPL-SCNC: 4.9 MMOL/L (ref 3.5–5.3)
PROT SERPL-MCNC: 6.3 G/DL (ref 6.1–8.1)
RBC # BLD AUTO: 4.48 MILLION/UL (ref 3.8–5.1)
SODIUM SERPL-SCNC: 140 MMOL/L (ref 135–146)
TRIGL SERPL-MCNC: 73 MG/DL
TSH SERPL-ACNC: 3.35 MIU/L (ref 0.4–4.5)
WBC # BLD AUTO: 5 THOUSAND/UL (ref 3.8–10.8)

## 2025-03-27 LAB
ALBUMIN/CREAT UR: NORMAL MG/G CREAT
APPEARANCE UR: CLEAR
BACTERIA #/AREA URNS HPF: ABNORMAL /HPF
BACTERIA UR CULT: ABNORMAL
BILIRUB UR QL STRIP: NEGATIVE
COLOR UR: YELLOW
CREAT UR-MCNC: 30 MG/DL (ref 20–275)
GLUCOSE UR QL STRIP: ABNORMAL
HGB UR QL STRIP: NEGATIVE
HYALINE CASTS #/AREA URNS LPF: ABNORMAL /LPF
KETONES UR QL STRIP: NEGATIVE
LEUKOCYTE ESTERASE UR QL STRIP: NEGATIVE
MICROALBUMIN UR-MCNC: <0.2 MG/DL
NITRITE UR QL STRIP: NEGATIVE
PH UR STRIP: ABNORMAL [PH] (ref 5–8)
PROT UR QL STRIP: ABNORMAL
RBC #/AREA URNS HPF: ABNORMAL /HPF
SERVICE CMNT-IMP: ABNORMAL
SP GR UR STRIP: 1.01 (ref 1–1.03)
SQUAMOUS #/AREA URNS HPF: ABNORMAL /HPF
WBC #/AREA URNS HPF: ABNORMAL /HPF

## 2025-03-28 ENCOUNTER — TELEPHONE (OUTPATIENT)
Dept: PRIMARY CARE | Facility: CLINIC | Age: 75
End: 2025-03-28
Payer: MEDICARE

## 2025-03-28 NOTE — TELEPHONE ENCOUNTER
FYI    Pt made aware and advised of covering provider recommendations. Pt stating UC cost too much offered other options stating that's ok I will just suffer through it

## 2025-03-28 NOTE — TELEPHONE ENCOUNTER
Should have visit to determine best treatment/cause of symptoms.  Recommend urgent care (can do in person or [facility] on demand vrtual care on MyChart)

## 2025-03-28 NOTE — TELEPHONE ENCOUNTER
Pt lvm stating her ears,throat are hurting stating she has some congestion in her chest asking if she can have an atbx stating she does have seasonal allergies

## 2025-03-29 DIAGNOSIS — R73.9 ELEVATED SERUM GLUCOSE: Primary | ICD-10-CM

## 2025-03-31 ENCOUNTER — TELEPHONE (OUTPATIENT)
Dept: PRIMARY CARE | Facility: CLINIC | Age: 75
End: 2025-03-31
Payer: MEDICARE

## 2025-03-31 NOTE — TELEPHONE ENCOUNTER
Call placed to pt, no answer. Lvm for pt to call the office back to confirm if she was inquiring about the b12 shot our office does not give vitamin d shots

## 2025-04-01 ENCOUNTER — TELEPHONE (OUTPATIENT)
Dept: PRIMARY CARE | Facility: CLINIC | Age: 75
End: 2025-04-01
Payer: MEDICARE

## 2025-04-01 DIAGNOSIS — A09 DIARRHEA OF INFECTIOUS ORIGIN: Primary | ICD-10-CM

## 2025-04-02 DIAGNOSIS — E53.8 VITAMIN B12 DEFICIENCY: ICD-10-CM

## 2025-04-02 RX ORDER — CYANOCOBALAMIN 1000 UG/ML
1000 INJECTION, SOLUTION INTRAMUSCULAR; SUBCUTANEOUS ONCE
Status: COMPLETED | OUTPATIENT
Start: 2025-04-03 | End: 2025-04-03

## 2025-04-02 NOTE — TELEPHONE ENCOUNTER
PT calling back stating that she was seeing Dr. Sharan mAbrosio and he was managing her B12 injections that she needs due to her surgery. Now that Dr. Sharan Ambrosio is no longer with , PT is wanting to know if Dr. Gill can manage her B12 injections.  PT requesting to be added to the nurse schedule any Tuesday or Wednesday before 12:00 PM.    PT states she may not be able to answer her phone while at work, and is OK to leave a detailed message.

## 2025-04-02 NOTE — TELEPHONE ENCOUNTER
Pt was put on nurse schedule for b12 injection tomorrow. Pt also state she has had diarrhea x3 day and she has tried otc antidiarrhea medication but it has not working

## 2025-04-02 NOTE — TELEPHONE ENCOUNTER
She can take Imodium up to 5 days. Try a BRAT diet (bananas, rice, applesauce, toast). Avoid dairy products. Try to replace electrolytes with Pedialyte.

## 2025-04-02 NOTE — TELEPHONE ENCOUNTER
Was she getting monthly injections? If so, yes, she can be added to the nurse schedule for injections.

## 2025-04-03 ENCOUNTER — TELEPHONE (OUTPATIENT)
Dept: PRIMARY CARE | Facility: CLINIC | Age: 75
End: 2025-04-03

## 2025-04-03 ENCOUNTER — CLINICAL SUPPORT (OUTPATIENT)
Dept: PRIMARY CARE | Facility: CLINIC | Age: 75
End: 2025-04-03
Payer: MEDICARE

## 2025-04-03 PROCEDURE — 2500000004 HC RX 250 GENERAL PHARMACY W/ HCPCS (ALT 636 FOR OP/ED): Performed by: INTERNAL MEDICINE

## 2025-04-03 PROCEDURE — 96372 THER/PROPH/DIAG INJ SC/IM: CPT | Performed by: INTERNAL MEDICINE

## 2025-04-03 RX ADMIN — CYANOCOBALAMIN 1000 MCG: 1000 INJECTION, SOLUTION INTRAMUSCULAR at 08:22

## 2025-04-03 NOTE — TELEPHONE ENCOUNTER
Patient called stating she needs a letter for her professors that she was in the office and unwell. 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 Please advise.

## 2025-04-03 NOTE — TELEPHONE ENCOUNTER
Pt here now and states she has been taking immondium x 5 days and still has loose stool 10 x a day

## 2025-04-04 NOTE — TELEPHONE ENCOUNTER
She only came in for a b12 shot - she complained of cough when asked the covid questions but was not evaluated. Please advise? I can give her a note stating she had a nurse visit but want to be sure that is okay with you since PCP did not see her.

## 2025-04-05 RX ORDER — AZITHROMYCIN 500 MG/1
500 TABLET, FILM COATED ORAL DAILY
Qty: 3 TABLET | Refills: 0 | Status: SHIPPED | OUTPATIENT
Start: 2025-04-05 | End: 2025-04-08

## 2025-04-06 LAB
C COLI+JEJUNI+LARI FUSA STL QL NAA+PROBE: DETECTED
C DIFF TOX GENS STL QL NAA+PROBE: NORMAL
EC STX1 GENE STL QL NAA+PROBE: NOT DETECTED
EC STX2 GENE STL QL NAA+PROBE: NOT DETECTED
NOROV GI+II ORF1-ORF2 JNC STL QL NAA+PR: NOT DETECTED
RVA NSP5 STL QL NAA+PROBE: NOT DETECTED
SALMONELLA SP RPOD STL QL NAA+PROBE: NOT DETECTED
SHIGELLA DNA SPEC QL NAA+PROBE: NOT DETECTED
V CHOL+PARA RFBL+TRKH+TNAA STL QL NAA+PR: NOT DETECTED
Y ENTERO RECN STL QL NAA+PROBE: NOT DETECTED

## 2025-04-07 NOTE — TELEPHONE ENCOUNTER
Pt made aware stating she is unsure what this means from quest she  is stating she collected the stool and took to the lab. Stating she never knew she was supposed to keep the stool cold. Also asking if you could elaborate on the bacteria campylobacter stating she has never heard of this.    Pt also asking about her glucose being elevated. I see where there was an A1c ordered by dr. Correia but pt never had the labs completed

## 2025-04-07 NOTE — TELEPHONE ENCOUNTER
Advise pt to view https://www.cdc.gov/campylobacter. There is accurate information on that site.   I'm assuming the lab should have kept the stool sample cold.   She should get the HgA1c checked that was ordered.

## 2025-04-09 LAB
C COLI+JEJUNI+LARI FUSA STL QL NAA+PROBE: DETECTED
EC STX1 GENE STL QL NAA+PROBE: NOT DETECTED
EC STX2 GENE STL QL NAA+PROBE: NOT DETECTED
NOROV GI+II ORF1-ORF2 JNC STL QL NAA+PR: NOT DETECTED
RVA NSP5 STL QL NAA+PROBE: NOT DETECTED
SALMONELLA SP RPOD STL QL NAA+PROBE: NOT DETECTED
SHIGELLA DNA SPEC QL NAA+PROBE: NOT DETECTED
V CHOL+PARA RFBL+TRKH+TNAA STL QL NAA+PR: NOT DETECTED
Y ENTERO RECN STL QL NAA+PROBE: NOT DETECTED

## 2025-04-11 ENCOUNTER — TELEPHONE (OUTPATIENT)
Dept: PRIMARY CARE | Facility: CLINIC | Age: 75
End: 2025-04-11
Payer: MEDICARE

## 2025-04-11 DIAGNOSIS — A09 DIARRHEA OF INFECTIOUS ORIGIN: Primary | ICD-10-CM

## 2025-04-11 NOTE — TELEPHONE ENCOUNTER
I do not recommend additional ATBx, as this could make it worse. Referral placed to GI for further evaluation.

## 2025-04-11 NOTE — TELEPHONE ENCOUNTER
Pt lvm stating she is still having issues with her stomach stating she is having diarrhea. Stating she was on 500 mg of azithromycin asking if she could get another refill

## 2025-04-23 ENCOUNTER — TELEPHONE (OUTPATIENT)
Dept: PRIMARY CARE | Facility: CLINIC | Age: 75
End: 2025-04-23
Payer: MEDICARE

## 2025-04-23 NOTE — TELEPHONE ENCOUNTER
Call placed to patient, advised letter sent via [employer]. Pt wants PCP to be aware there is a wait to see GI until July. Pt was referred due to diarrhea and wants to know if PCP has any recommendations for her in the meantime.

## 2025-04-23 NOTE — LETTER
April 23, 2025    To whom it may concern,    Oxana Rao had an annual wellness exam on 2/12/2025.                Kind regards,              Michael Gill M.D.

## 2025-04-23 NOTE — TELEPHONE ENCOUNTER
PT calling requesting a letter be written for her employer/insurance stating that she had a physical/annual wellness completed 2/12/2025.    PT was given her after visit summary as requested last week, but is now stating that she doesn't need all those details, just a letter stating she had a physical/wellness completed on that date. Please call PT when letter is completed [phone]

## 2025-05-05 DIAGNOSIS — Z76.0 PRESCRIPTION REFILL: ICD-10-CM

## 2025-05-05 DIAGNOSIS — I10 ESSENTIAL HYPERTENSION: ICD-10-CM

## 2025-05-05 RX ORDER — HYDROCHLOROTHIAZIDE 25 MG/1
25 TABLET ORAL DAILY
Qty: 90 TABLET | Refills: 2 | Status: SHIPPED | OUTPATIENT
Start: 2025-05-05 | End: 2025-05-07 | Stop reason: SDUPTHER

## 2025-05-07 ENCOUNTER — PATIENT MESSAGE (OUTPATIENT)
Dept: PRIMARY CARE | Facility: CLINIC | Age: 75
End: 2025-05-07
Payer: MEDICARE

## 2025-05-07 DIAGNOSIS — I10 ESSENTIAL HYPERTENSION: ICD-10-CM

## 2025-05-07 RX ORDER — HYDROCHLOROTHIAZIDE 25 MG/1
25 TABLET ORAL DAILY
Qty: 90 TABLET | Refills: 2 | Status: SHIPPED | OUTPATIENT
Start: 2025-05-07

## 2025-05-13 DIAGNOSIS — E53.8 VITAMIN B12 DEFICIENCY: ICD-10-CM

## 2025-05-13 RX ORDER — CYANOCOBALAMIN 1000 UG/ML
1000 INJECTION, SOLUTION INTRAMUSCULAR; SUBCUTANEOUS ONCE
Status: COMPLETED | OUTPATIENT
Start: 2025-05-14 | End: 2025-05-14

## 2025-05-14 ENCOUNTER — CLINICAL SUPPORT (OUTPATIENT)
Dept: PRIMARY CARE | Facility: CLINIC | Age: 75
End: 2025-05-14
Payer: MEDICARE

## 2025-05-14 DIAGNOSIS — E53.8 VITAMIN B12 DEFICIENCY: ICD-10-CM

## 2025-05-14 PROCEDURE — 2500000004 HC RX 250 GENERAL PHARMACY W/ HCPCS (ALT 636 FOR OP/ED): Mod: JZ | Performed by: INTERNAL MEDICINE

## 2025-05-14 PROCEDURE — 96372 THER/PROPH/DIAG INJ SC/IM: CPT | Performed by: INTERNAL MEDICINE

## 2025-05-14 RX ADMIN — CYANOCOBALAMIN 1000 MCG: 1000 INJECTION, SOLUTION INTRAMUSCULAR at 11:01

## 2025-05-15 LAB
EST. AVERAGE GLUCOSE BLD GHB EST-MCNC: 114 MG/DL
EST. AVERAGE GLUCOSE BLD GHB EST-SCNC: 6.3 MMOL/L
HBA1C MFR BLD: 5.6 %

## 2025-06-02 ENCOUNTER — OFFICE VISIT (OUTPATIENT)
Dept: PRIMARY CARE | Facility: CLINIC | Age: 75
End: 2025-06-02
Payer: MEDICARE

## 2025-06-02 VITALS
DIASTOLIC BLOOD PRESSURE: 68 MMHG | SYSTOLIC BLOOD PRESSURE: 110 MMHG | TEMPERATURE: 97.1 F | OXYGEN SATURATION: 100 % | HEART RATE: 78 BPM | BODY MASS INDEX: 32.12 KG/M2 | WEIGHT: 199 LBS

## 2025-06-02 DIAGNOSIS — M79.89 LEG SWELLING: Primary | ICD-10-CM

## 2025-06-02 PROCEDURE — G2211 COMPLEX E/M VISIT ADD ON: HCPCS | Performed by: INTERNAL MEDICINE

## 2025-06-02 PROCEDURE — 1126F AMNT PAIN NOTED NONE PRSNT: CPT | Performed by: INTERNAL MEDICINE

## 2025-06-02 PROCEDURE — 99213 OFFICE O/P EST LOW 20 MIN: CPT | Performed by: INTERNAL MEDICINE

## 2025-06-02 PROCEDURE — 3078F DIAST BP <80 MM HG: CPT | Performed by: INTERNAL MEDICINE

## 2025-06-02 PROCEDURE — 3074F SYST BP LT 130 MM HG: CPT | Performed by: INTERNAL MEDICINE

## 2025-06-02 PROCEDURE — 1159F MED LIST DOCD IN RCRD: CPT | Performed by: INTERNAL MEDICINE

## 2025-06-02 RX ORDER — POTASSIUM CHLORIDE 20 MEQ/1
20 TABLET, EXTENDED RELEASE ORAL DAILY
Qty: 3 TABLET | Refills: 0 | Status: SHIPPED | OUTPATIENT
Start: 2025-06-02 | End: 2025-06-03 | Stop reason: SDUPTHER

## 2025-06-02 RX ORDER — FUROSEMIDE 20 MG/1
20 TABLET ORAL DAILY
Qty: 3 TABLET | Refills: 0 | Status: SHIPPED | OUTPATIENT
Start: 2025-06-02 | End: 2025-06-03 | Stop reason: SDUPTHER

## 2025-06-02 ASSESSMENT — PAIN SCALES - GENERAL: PAINLEVEL_OUTOF10: 0-NO PAIN

## 2025-06-02 NOTE — PROGRESS NOTES
Subjective   Patient ID: Oxana Rao is a 75 y.o. female who presents for Foot Swelling.    This is a 75 y.o. presenting with marked BLE edema for the past week. 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 She denies SOB, MACEDO, orthopnea, CP.          Review of Systems   Constitutional:  Positive for activity change. Negative for appetite change, fatigue, fever and unexpected weight change.   Respiratory:  Negative for shortness of breath.    Cardiovascular:  Positive for leg swelling. Negative for chest pain and palpitations.   Neurological:  Negative for dizziness and headaches.       Objective   /68   Pulse 78   Temp 36.2 °C (97.1 °F)   Wt 90.3 kg (199 lb)   SpO2 100%   BMI 32.12 kg/m²     Physical Exam  Vitals reviewed.   Constitutional:       General: She is not in acute distress.     Appearance: Normal appearance. She is not ill-appearing.   Cardiovascular:      Rate and Rhythm: Normal rate and regular rhythm.      Heart sounds: Normal heart sounds.   Pulmonary:      Effort: Pulmonary effort is normal.      Breath sounds: Normal breath sounds.   Musculoskeletal:      Right lower leg: No tenderness. 2+ Edema present.      Left lower leg: No tenderness. 2+ Edema present.   Skin:     General: Skin is warm and dry.   Neurological:      General: No focal deficit present.      Mental Status: She is alert and oriented to person, place, and time.   Psychiatric:         Mood and Affect: Mood normal.         Assessment/Plan   Diagnoses and all orders for this visit:  Leg swelling  -     furosemide (Lasix) 20 mg tablet; Take 1 tablet (20 mg) by mouth once daily for 3 days.  -     potassium chloride CR (Klor-Con M20) 20 mEq ER tablet; Take 1 tablet (20 mEq) by mouth once daily for 3 days. Do not crush or chew.

## 2025-06-03 DIAGNOSIS — M79.89 LEG SWELLING: ICD-10-CM

## 2025-06-03 RX ORDER — POTASSIUM CHLORIDE 20 MEQ/1
20 TABLET, EXTENDED RELEASE ORAL DAILY PRN
Qty: 30 TABLET | Refills: 0 | Status: SHIPPED | OUTPATIENT
Start: 2025-06-03 | End: 2025-07-03

## 2025-06-03 RX ORDER — FUROSEMIDE 20 MG/1
20 TABLET ORAL DAILY PRN
Qty: 30 TABLET | Refills: 0 | Status: SHIPPED | OUTPATIENT
Start: 2025-06-03 | End: 2025-07-03

## 2025-06-03 ASSESSMENT — ENCOUNTER SYMPTOMS
FATIGUE: 0
HEADACHES: 0
FEVER: 0
ACTIVITY CHANGE: 1
UNEXPECTED WEIGHT CHANGE: 0
DIZZINESS: 0
APPETITE CHANGE: 0
SHORTNESS OF BREATH: 0
PALPITATIONS: 0

## 2025-06-03 NOTE — TELEPHONE ENCOUNTER
I don't want her to take Furosemide daily after the 3 day course. I will prescribe a 30 day supply, but she should only take them as needed for leg swelling. On the days that she takes Furosemide, she should also take the potassium. She should not take Furosemide and Hydrochlorothiazide in the same day.

## 2025-06-03 NOTE — TELEPHONE ENCOUNTER
Patient lvm requesting a 30 d supply of the Furosemide? Pt stated that the swelling in her ankle went down more than half in two days. Please advise.

## 2025-06-04 ENCOUNTER — PATIENT MESSAGE (OUTPATIENT)
Dept: PRIMARY CARE | Facility: CLINIC | Age: 75
End: 2025-06-04
Payer: MEDICARE

## 2025-06-04 NOTE — TELEPHONE ENCOUNTER
LVM informing patient the a [employer] message has been sent to her with the providers response to the medication refill request.

## 2025-06-04 NOTE — PATIENT COMMUNICATION
Patient called and has been verbally provided the providers response for the Furosemide medication. No further needs at this time.

## 2025-06-05 ENCOUNTER — TELEPHONE (OUTPATIENT)
Dept: PRIMARY CARE | Facility: CLINIC | Age: 75
End: 2025-06-05
Payer: MEDICARE
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
## 2025-06-05 DIAGNOSIS — S90.569A INSECT BITE OF ANKLE, UNSPECIFIED LATERALITY, INITIAL ENCOUNTER: Primary | ICD-10-CM

## 2025-06-05 RX ORDER — CEPHALEXIN 500 MG/1
500 CAPSULE ORAL 2 TIMES DAILY
Qty: 10 CAPSULE | Refills: 0 | Status: SHIPPED | OUTPATIENT
Start: 2025-06-05 | End: 2025-06-10

## 2025-06-05 NOTE — TELEPHONE ENCOUNTER
Patient called in stating that she remembered what caused her ankle to swell. Patient was bitten by a bug at work in a patients room x 5 d ago and the swelling is not from water retention. Patient stating that the swelling has decreased in the ankle where bitten. But the ankle is sore and there is a red streak going across the foot. Denies being warm to the touch. Bite site treated with ice, elevation and topical benadryl. Patient requesting providers response to be sent via [employer] Message. Please advise.

## 2025-06-06 ENCOUNTER — APPOINTMENT (OUTPATIENT)
Dept: UROLOGY | Facility: CLINIC | Age: 75
End: 2025-06-06
Payer: MEDICARE

## 2025-06-09 ENCOUNTER — PATIENT OUTREACH (OUTPATIENT)
Dept: PRIMARY CARE | Facility: CLINIC | Age: 75
End: 2025-06-09
Payer: MEDICARE

## 2025-06-09 ENCOUNTER — TELEPHONE (OUTPATIENT)
Dept: PRIMARY CARE | Facility: CLINIC | Age: 75
End: 2025-06-09
Payer: MEDICARE

## 2025-06-09 DIAGNOSIS — E87.6 HYPOKALEMIA: ICD-10-CM

## 2025-06-09 DIAGNOSIS — E87.1 HYPONATREMIA: Primary | ICD-10-CM

## 2025-06-09 RX ORDER — DOXYCYCLINE 100 MG/1
100 CAPSULE ORAL 2 TIMES DAILY
COMMUNITY
Start: 2025-06-07 | End: 2025-06-17

## 2025-06-09 RX ORDER — ACETAMINOPHEN 325 MG/1
650 TABLET ORAL EVERY 6 HOURS PRN
COMMUNITY
Start: 2025-06-07

## 2025-06-09 NOTE — PROGRESS NOTES
Discharge Facility:  [facility]  Discharge Diagnosis: edema  Admission Date: 6/6/25  Discharge Date:  6/7/25    PCP Appointment Date: 6/18/25  Specialist Appointment Date: podiatry - TBD  Hospital Encounter and Summary Linked: Yes  Hospital Encounter    See discharge assessment below for further details     Wrap Up  Wrap Up Additional Comments: Successful transitions of care outreach to patient. Patient reports she is doing the same. Her foot continues to be red and swollen. She states she can't put her shoe on. Reviewed new medication and she is taking the antibiotic as prescribed. She has no questions related to medications. Patient had some questions/concerns related to lab results while in the hospital. Specifically reports her sodium and potassium levels were low.  She was taking furosemide, but reports was also taking a potassium supplement with that.  Offered to send message to PCP to ask her to review and determine if she would like repeat blood work or okay to hold off until 6/18/25 appt. Patient in agreement with this plan. Patient states her protein was also low. Admits she does not eat well and likely does not get enough protein in her diet. She will try to work on this.  PCP appt is already scheduled. She will make appt with podiatrist. Encouraged her to notify her provider if the swelling/redness is not getting any better after several days of the antibiotic. (6/9/2025  4:42 PM)    Engagement  Call Start Time: 1629 (6/9/2025  4:42 PM)    Medications  Medications reviewed with patient/caregiver?: Yes (6/9/2025  4:42 PM)  Is the patient having any side effects they believe may be caused by any medication additions or changes?: No (6/9/2025  4:42 PM)  Does the patient have all medications ordered at discharge?: Yes (6/9/2025  4:42 PM)  Care Management Interventions: No intervention needed (6/9/2025  4:42 PM)  Prescription Comments: Start: doxycycline hyclate 100mg every 12 hours for 10 days;    Acetaminophen 325mg 2 tablets by mouth every six hours as needed for pain/fever (6/9/2025  4:42 PM)  Is the patient taking all medications as directed (includes completed medication regime)?: Yes (6/9/2025  4:42 PM)  Care Management Interventions: Provided patient education (6/9/2025  4:42 PM)  Medication Comments: Reviewed new medications with patient. She had no questions/concerns related to medications. (6/9/2025  4:42 PM)    Appointments  Does the patient have a primary care provider?: Yes (6/9/2025  4:42 PM)  Care Management Interventions: Verified appointment date/time/provider (6/9/2025  4:42 PM)  Has the patient kept scheduled appointments due by today?: Not applicable (6/9/2025  4:42 PM)    Self Management  What is the home health agency?: n/a (6/9/2025  4:42 PM)  Has home health visited the patient within 72 hours of discharge?: Not applicable (6/9/2025  4:42 PM)    Patient Teaching  Does the patient have access to their discharge instructions?: Yes (6/9/2025  4:42 PM)  Care Management Interventions: Reviewed instructions with patient (6/9/2025  4:42 PM)  What is the patient's perception of their health status since discharge?: Same (6/9/2025  4:42 PM)  Is the patient/caregiver able to teach back the hierarchy of who to call/visit for symptoms/problems? PCP, Specialist, Home Health nurse, Urgent Care, ED, 911: Yes (6/9/2025  4:42 PM)

## 2025-06-09 NOTE — TELEPHONE ENCOUNTER
**Pt had concerns regarding blood work taken while in the hospital. States her sodium and potassium were low. She is wondering if this should be rechecked or is it okay to wait and discuss at 6/18/25 appt? Thank you.     Please advise pt that order has been placed for recheck.

## 2025-06-11 LAB
ANION GAP SERPL CALCULATED.4IONS-SCNC: 7 MMOL/L (CALC) (ref 7–17)
BUN SERPL-MCNC: 17 MG/DL (ref 7–25)
BUN/CREAT SERPL: NORMAL (CALC) (ref 6–22)
CALCIUM SERPL-MCNC: 8.6 MG/DL (ref 8.6–10.4)
CHLORIDE SERPL-SCNC: 110 MMOL/L (ref 98–110)
CO2 SERPL-SCNC: 25 MMOL/L (ref 20–32)
CREAT SERPL-MCNC: 0.91 MG/DL (ref 0.6–1)
EGFRCR SERPLBLD CKD-EPI 2021: 66 ML/MIN/1.73M2
GLUCOSE SERPL-MCNC: 79 MG/DL (ref 65–99)
POTASSIUM SERPL-SCNC: 5 MMOL/L (ref 3.5–5.3)
SODIUM SERPL-SCNC: 142 MMOL/L (ref 135–146)

## 2025-06-18 ENCOUNTER — OFFICE VISIT (OUTPATIENT)
Dept: PRIMARY CARE | Facility: CLINIC | Age: 75
End: 2025-06-18
Payer: MEDICARE

## 2025-06-18 VITALS
OXYGEN SATURATION: 96 % | WEIGHT: 198 LBS | HEART RATE: 86 BPM | DIASTOLIC BLOOD PRESSURE: 84 MMHG | BODY MASS INDEX: 31.96 KG/M2 | TEMPERATURE: 97.1 F | SYSTOLIC BLOOD PRESSURE: 128 MMHG

## 2025-06-18 DIAGNOSIS — M79.89 LEG SWELLING: Primary | ICD-10-CM

## 2025-06-18 PROCEDURE — 3079F DIAST BP 80-89 MM HG: CPT | Performed by: INTERNAL MEDICINE

## 2025-06-18 PROCEDURE — 3074F SYST BP LT 130 MM HG: CPT | Performed by: INTERNAL MEDICINE

## 2025-06-18 PROCEDURE — 99495 TRANSJ CARE MGMT MOD F2F 14D: CPT | Performed by: INTERNAL MEDICINE

## 2025-06-18 PROCEDURE — 1036F TOBACCO NON-USER: CPT | Performed by: INTERNAL MEDICINE

## 2025-06-18 PROCEDURE — 1125F AMNT PAIN NOTED PAIN PRSNT: CPT | Performed by: INTERNAL MEDICINE

## 2025-06-18 RX ORDER — METHYLPREDNISOLONE 4 MG/1
TABLET ORAL
Qty: 21 TABLET | Refills: 0 | Status: SHIPPED | OUTPATIENT
Start: 2025-06-18 | End: 2025-06-24

## 2025-06-18 ASSESSMENT — ENCOUNTER SYMPTOMS
FEVER: 0
SHORTNESS OF BREATH: 0
ACTIVITY CHANGE: 1
CHILLS: 0
HEADACHES: 0
DIZZINESS: 0

## 2025-06-18 ASSESSMENT — PAIN SCALES - GENERAL: PAINLEVEL_OUTOF10: 8

## 2025-06-18 NOTE — PROGRESS NOTES
Subjective   Patient ID: Oxana Rao is a 75 y.o. female who presents for No chief complaint on file..    This is a 75 y.o. who felt something bite her left ankle on 5/30. She subsequently developed marked swelling in her left leg and was  hospitalized for cellulitis at [facility] 6/6-6/7. She has completed her course of Doxycycline, but still has swelling in her left leg. The redness has resolved and her labs did not indicate any subsequent infection. She states that her Podiatrist mentioned possibly starting her on steroids, but Meloxicam was sent instead. Pt would like to try steroids. She has an MRI scheduled to further evaluate the swelling.          Review of Systems   Constitutional:  Positive for activity change. Negative for chills and fever.   Respiratory:  Negative for shortness of breath.    Cardiovascular:  Negative for chest pain.   Musculoskeletal:         See HPI   Neurological:  Negative for dizziness and headaches.       Objective   /84   Pulse 86   Temp 36.2 °C (97.1 °F)   Wt 89.8 kg (198 lb)   SpO2 96%   BMI 31.96 kg/m²     Physical Exam  Vitals reviewed.   Constitutional:       Appearance: Normal appearance.   Cardiovascular:      Rate and Rhythm: Normal rate and regular rhythm.      Heart sounds: Normal heart sounds.   Pulmonary:      Effort: Pulmonary effort is normal.      Breath sounds: Normal breath sounds.   Musculoskeletal:         General: No tenderness.      Left lower leg: Edema present.   Skin:     General: Skin is warm and dry.      Findings: No erythema.   Neurological:      General: No focal deficit present.      Mental Status: She is alert and oriented to person, place, and time.   Psychiatric:         Mood and Affect: Mood normal.         Assessment/Plan   Diagnoses and all orders for this visit:  Leg swelling  Comments:  Await MRI results. Pt may require lymphedema clinic referral in the near future.  Hospital records reviewed.  Orders:  -      methylPREDNISolone (Medrol Dospak) 4 mg tablets; Take as directed on package.

## 2025-07-24 ENCOUNTER — APPOINTMENT (OUTPATIENT)
Dept: UROLOGY | Facility: CLINIC | Age: 75
End: 2025-07-24
Payer: MEDICARE

## 2025-08-22 ENCOUNTER — TELEPHONE (OUTPATIENT)
Dept: PRIMARY CARE | Facility: CLINIC | Age: 75
End: 2025-08-22
Payer: MEDICARE

## 2025-08-28 DIAGNOSIS — Z11.1 PPD SCREENING TEST: ICD-10-CM

## 2025-08-28 DIAGNOSIS — Z23 NEED FOR MMR VACCINE: ICD-10-CM

## 2025-09-05 ENCOUNTER — CLINICAL SUPPORT (OUTPATIENT)
Dept: PRIMARY CARE | Facility: CLINIC | Age: 75
End: 2025-09-05
Payer: MEDICARE

## 2025-09-05 DIAGNOSIS — Z11.1 PPD SCREENING TEST: ICD-10-CM

## 2025-09-05 DIAGNOSIS — Z23 NEED FOR MMR VACCINE: ICD-10-CM

## 2025-09-05 PROCEDURE — 86580 TB INTRADERMAL TEST: CPT | Performed by: INTERNAL MEDICINE

## 2025-09-05 PROCEDURE — 90707 MMR VACCINE SC: CPT | Performed by: INTERNAL MEDICINE

## 2025-09-09 ENCOUNTER — APPOINTMENT (OUTPATIENT)
Dept: UROLOGY | Facility: CLINIC | Age: 75
End: 2025-09-09
Payer: MEDICARE

## (undated) DEVICE — SUTURE, VICRYL, 4-0, 27 IN, PS-2, UNDYED

## (undated) DEVICE — CABLE, PERCUTANEOUS EXTENSION, INTERSTIM, FOR 4.32MM LEAD

## (undated) DEVICE — PENCIL, ELECTROSURG, W/BUTTON SWITCH & HOLSTER, EZ CLEAN, DISP

## (undated) DEVICE — SPONGE, GAUZE, 12 PLY, 4 X 4, STERILE, DISP

## (undated) DEVICE — WATER STERILE, FOR IRRIGATION, 1000ML, W/HANGER

## (undated) DEVICE — DRAPE COVER, C ARM, FLOUROSCAN IMAGING SYS

## (undated) DEVICE — Device

## (undated) DEVICE — SUTURE, VICRYL, 2-0, 27 IN, SH, UNDYED

## (undated) DEVICE — GOWN, ECLIPSE, PREVENTION PLUS,  XXLARGE, XLONG

## (undated) DEVICE — PREP TRAY, SKIN, DRY, W/GLOVES

## (undated) DEVICE — PROGRAMMER KIT, INTERSTIM X SMART, COMM HANDSET

## (undated) DEVICE — DRAPE, SHEET, 17 X 23 IN

## (undated) DEVICE — SUTURE, PROLENE, 2-0, 30 IN, CT2, BLUE

## (undated) DEVICE — GLOVE, SURGICAL, PROTEXIS PI BLUE W/NEUTHERA, 7.5, PF, LF

## (undated) DEVICE — SPONGE, GAUZE, RADIOPAQUE, 12 PLY, 4 X 8 IN, STERILE, LF

## (undated) DEVICE — GLOVE, PROTEXIS PI CLASSIC, SZ-7.5, PF, LF

## (undated) DEVICE — APPLICATOR, CHLORAPREP, W/ORANGE TINT, 26ML

## (undated) DEVICE — ADULT REM POLYHESIVE II PATIENT RETURN ELECTRODE W/9 FT (2.7 M) ATTACHED CORD